# Patient Record
Sex: MALE | Race: WHITE | NOT HISPANIC OR LATINO | URBAN - METROPOLITAN AREA
[De-identification: names, ages, dates, MRNs, and addresses within clinical notes are randomized per-mention and may not be internally consistent; named-entity substitution may affect disease eponyms.]

---

## 2017-02-03 ENCOUNTER — ALLSCRIPTS OFFICE VISIT (OUTPATIENT)
Dept: OTHER | Facility: OTHER | Age: 52
End: 2017-02-03

## 2017-02-10 ENCOUNTER — ALLSCRIPTS OFFICE VISIT (OUTPATIENT)
Dept: OTHER | Facility: OTHER | Age: 52
End: 2017-02-10

## 2017-06-23 ENCOUNTER — GENERIC CONVERSION - ENCOUNTER (OUTPATIENT)
Dept: OTHER | Facility: OTHER | Age: 52
End: 2017-06-23

## 2017-08-04 ENCOUNTER — ALLSCRIPTS OFFICE VISIT (OUTPATIENT)
Dept: OTHER | Facility: OTHER | Age: 52
End: 2017-08-04

## 2017-08-10 DIAGNOSIS — E11.8 TYPE 2 DIABETES MELLITUS WITH COMPLICATIONS (HCC): ICD-10-CM

## 2017-12-13 ENCOUNTER — GENERIC CONVERSION - ENCOUNTER (OUTPATIENT)
Dept: FAMILY MEDICINE CLINIC | Facility: CLINIC | Age: 52
End: 2017-12-13

## 2017-12-15 ENCOUNTER — ALLSCRIPTS OFFICE VISIT (OUTPATIENT)
Dept: OTHER | Facility: OTHER | Age: 52
End: 2017-12-15

## 2017-12-15 ENCOUNTER — GENERIC CONVERSION - ENCOUNTER (OUTPATIENT)
Dept: OTHER | Facility: OTHER | Age: 52
End: 2017-12-15

## 2017-12-15 DIAGNOSIS — E11.8 TYPE 2 DIABETES MELLITUS WITH COMPLICATIONS (HCC): ICD-10-CM

## 2017-12-15 DIAGNOSIS — Z12.5 ENCOUNTER FOR SCREENING FOR MALIGNANT NEOPLASM OF PROSTATE: ICD-10-CM

## 2018-01-11 NOTE — MISCELLANEOUS
Message  Return to work or school:   Issa Hirsch is under my professional care  He was seen in my office on 2/8/16       Please adjust start time to 11;00 pm through5/16/16          Signatures   Electronically signed by : Shayna Flannery DO; Feb 8 2016 10:31AM EST                       (Author)    Electronically signed by : Shayna Flannery DO; Feb 15 2016  8:01AM EST                       (Author)

## 2018-01-12 VITALS
SYSTOLIC BLOOD PRESSURE: 134 MMHG | HEART RATE: 80 BPM | HEIGHT: 62 IN | DIASTOLIC BLOOD PRESSURE: 76 MMHG | BODY MASS INDEX: 28.63 KG/M2 | WEIGHT: 155.6 LBS

## 2018-01-12 NOTE — MISCELLANEOUS
Message  Return to work or school:  8/12/16       Schedule from 8/16/16 through2/16/17 to start at 11:00 pm         Signatures   Electronically signed by : Conor Tuttle DO; Aug 12 2016 10:56AM EST                       (Author)    Electronically signed by : Conor Tuttle DO; Aug 28 2016  8:37PM EST                       (Author)

## 2018-01-14 VITALS
SYSTOLIC BLOOD PRESSURE: 112 MMHG | BODY MASS INDEX: 28.67 KG/M2 | WEIGHT: 155.8 LBS | HEART RATE: 76 BPM | HEIGHT: 62 IN | DIASTOLIC BLOOD PRESSURE: 74 MMHG

## 2018-01-16 NOTE — MISCELLANEOUS
Message  Return to work or school:   Walter Zuniga is under my professional care  He was seen in my office on 5/16/16   He is able to return to work on  5/16/16      Please allow robin to work 11:00 pm to7:00 am 5/16/16 through8/16/16 due to health issuea          Signatures   Electronically signed by : Stella Wang DO; May 16 2016  9:38AM EST                       (Author)    Electronically signed by : Stella Wang DO; May 20 2016  6:10PM EST                       (Author)

## 2018-01-17 NOTE — MISCELLANEOUS
Message  Return to work or school:   Llewellyn Leventhal is under my professional care  He was seen in my office on 8/4/17   He is able to return to work on  8/7/17      Please excuse from work 8/1 through 5/7/33/NAT to complications from diabetes          Signatures   Electronically signed by : Julian Batres DO; Aug  4 2017  1:29PM EST                       (Author)    Electronically signed by : Julian Batres DO; Aug  6 2017  8:40PM EST                       (Author)

## 2018-01-22 VITALS
TEMPERATURE: 98.5 F | WEIGHT: 145.38 LBS | OXYGEN SATURATION: 95 % | HEART RATE: 78 BPM | HEIGHT: 62 IN | SYSTOLIC BLOOD PRESSURE: 118 MMHG | RESPIRATION RATE: 16 BRPM | BODY MASS INDEX: 26.75 KG/M2 | DIASTOLIC BLOOD PRESSURE: 80 MMHG

## 2018-01-23 NOTE — PROGRESS NOTES
Assessment    1  Encounter for preventive health examination (V70 0) (Z00 00)   2  Controlled diabetes mellitus type 2 with complications (748 77) (H04 7)   3  Encounter for prostate cancer screening (V76 44) (Z12 5)    Plan  Allergic rhinitis    · Montelukast Sodium 10 MG Oral Tablet (Singulair)  Controlled diabetes mellitus type 2 with complications    · (1) COMPREHENSIVE METABOLIC PANEL; Status:Active; Requested for:15Vna4474;    · (1) LIPID PANEL FASTING W DIRECT LDL REFLEX; Status:Active; Requested  for:45Gtn0845;   Encounter for prostate cancer screening    · (1) PSA (SCREEN) (Dx V76 44 Screen for Prostate Cancer); Status:Active; Requested  for:93Otf0569;    · (1) URINALYSIS (will reflex a microscopy if leukocytes, occult blood, protein or nitrites  are not within normal limits); Status:Active; Requested for:27Pbf6393;   Flu vaccine need    · Fluzone Quadrivalent Intramuscular Suspension    Discussion/Summary  health maintenance visit Currently, he eats a healthy diet  Prostate cancer screening: PSA was ordered  Testicular cancer screening: testicular cancer screening is not indicated  Colorectal cancer screening: colorectal cancer screening is current  Screening lab work includes lipid profile  The immunizations are up to date  Advice and education were given regarding vitamin D supplements  Patient discussion: discussed with the patient  The patient was counseled on Hepatitis C screening  The patient agrees to Hepatitis C screening  Chief Complaint  Pt here for physical      History of Present Illness  HM, Adult Male:   General Health: The patient's health since the last visit is described as good  He has regular dental visits  He denies vision problems  He has hearing loss  Lifestyle:  He consumes a diverse and healthy diet  He does not have any weight concerns  He exercises regularly  He does not use tobacco  He consumes alcohol  He denies drug use     Screening: cancer screening reviewed and updated  metabolic screening reviewed and updated  risk screening reviewed and updated  Review of Systems    Constitutional: No fever or chills, feels well, no tiredness, no recent weight gain or weight loss  Eyes: No complaints of eye pain, no red eyes, no discharge from eyes, no itchy eyes  The patient presents with complaints of bilateral ear hearing loss, described as difficulty with all sounds  Cardiovascular: No complaints of slow heart rate, no fast heart rate, no chest pain, no palpitations, no leg claudication, no lower extremity  Respiratory: No complaints of shortness of breath, no wheezing, no cough, no SOB on exertion, no orthopnea or PND  Gastrointestinal: No complaints of abdominal pain, no constipation, no nausea or vomiting, no diarrhea or bloody stools  Genitourinary: No complaints of dysuria, no incontinence, no hesitancy, no nocturia, no genital lesion, no testicular pain  Musculoskeletal: arthralgias and joint stiffness  Integumentary: No complaints of skin rash or skin lesions, no itching, no skin wound, no dry skin  Neurological: No compliants of headache, no confusion, no convulsions, no numbness or tingling, no dizziness or fainting, no limb weakness, no difficulty walking  Psychiatric: Is not suicidal, no sleep disturbances, no anxiety or depression, no change in personality, no emotional problems  Endocrine: No complaints of proptosis, no hot flashes, no muscle weakness, no erectile dysfunction, no deepening of the voice, no feelings of weakness  Hematologic/Lymphatic: No complaints of swollen glands, no swollen glands in the neck, does not bleed easily, no easy bruising  Active Problems    1  Acute URI (465 9) (J06 9)   2  Allergic rhinitis (477 9) (J30 9)   3  Anxiety (300 00) (F41 9)   4  Back pain, acute (724 5) (M54 9)   5  Bilateral low back pain without sciatica (724 2) (M54 5)   6   Controlled diabetes mellitus type 2 with complications (615 53) (E11 8)   7  Encounter for completion of form with patient (V68 89) (Z02 89)   8  Erectile dysfunction (607 84) (N52 9)   9  Flu syndrome (487 1) (J11 1)   10  Flu vaccine need (V04 81) (Z23)   11  Hyperlipidemia (272 4) (E78 5)   12  Rhus dermatitis (692 6) (L23 7)   13  Shoulder pain, left (719 41) (M25 512)   14  Tendonitis of elbow, left (727 09) (M77 8)   15  Tendonitis of elbow, right (727 09) (M77 8)   16  Type 2 diabetes mellitus (250 00) (E11 9)    Past Medical History    · Anxiety (300 00) (F41 9)   · Denied: History of alcohol abuse   · History of hypertension (V12 59) (Z86 79)   · Denied: History of mental disorder   · Denied: History of substance abuse   · Hyperlipidemia (272 4) (E78 5)   · Type 2 diabetes mellitus (250 00) (E11 9)    Surgical History    · History of Nasal Septal Deviation Repair    Family History  Mother    · Family history of Ovarian cancer  Father    · Family history of lung cancer (V16 1) (Z80 1)  Brother    · Family history of arthritis (V17 7) (Z82 61)  Family History    · Denied: Family history of alcohol abuse   · Denied: Family history of mental disorder   · Denied: Family history of substance abuse    Social History    · Advance directive status unknown (V49 89) (Z78 9)   · Alcohol use   · Always uses seat belt   · Does not exercise (V69 0) (Z72 3)   · Employed   · Denied: History of domestic violence   · House   · Lives with spouse   ·    · Never a smoker   · One child   · Denied: History of Alevism status   · Supportive and safe   · Uses     Current Meds   1  ALPRAZolam 0 25 MG Oral Tablet; TAKE ONE TABLET BY MOUTH THREE TIMES   DAILY; Therapy: 53KEG6554 to (Evaluate:31Jan2018); Last Rx:14Bmz3602 Ordered   2  Janumet TABS Recorded   3  Montelukast Sodium 10 MG Oral Tablet; Take 1 tablet daily; Therapy: 40SNP3317 to (Crissy Krishnan)  Requested for: 64XPY0991; Last   Rx:09Mar2016 Ordered   4   Simvastatin 40 MG Oral Tablet; TAKE 1 TABLET DAILY AT BEDTIME; Therapy: 34IRL6357 to (Evaluate:15Xjk3788); Last Rx:44Xix5367 Ordered    Allergies    1  codeine   2  Penicillins    3  Seasonal    Vitals   Recorded: 13QDD3598 09:37AM   Temperature 97 4 F, Temporal   Heart Rate 66   Respiration 16   Systolic 071, Sitting   Diastolic 82, Sitting   Height 5 ft 2 in   Weight 149 lb 2 oz   BMI Calculated 27 28   BSA Calculated 1 69     Physical Exam    Constitutional   General appearance: No acute distress, well appearing and well nourished  Eyes   Pupils and irises: Equal, round, reactive to light  Ears, Nose, Mouth, and Throat   External inspection of ears and nose: Normal   wearing hearing aids bilat  Oropharynx: Normal with no erythema, edema, exudate or lesions  Neck   Thyroid: Normal, no thyromegaly  Pulmonary   Auscultation of lungs: Clear to auscultation  Cardiovascular   Auscultation of heart: Normal rate and rhythm, normal S1 and S2, no murmurs  Peripheral vascular exam: Normal     Examination of extremities for edema and/or varicosities: Normal     Abdomen   Abdomen: Non-tender, no masses  Musculoskeletal   Gait and station: Normal     Inspection/palpation of joints, bones, and muscles: Normal     Stability: Normal     Muscle strength/tone: Normal     Skin   Skin and subcutaneous tissue: Normal without rashes or lesions      Psychiatric   Orientation to person, place and time: Normal     Mood and affect: Normal        Results/Data  (1) HEMOGLOBIN A1C 67JNO6760 09:47AM Ashlee Barkley     Test Name Result Flag Reference   HEMOGLOBIN A1C 6 7         Future Appointments    Date/Time Provider Specialty Site   02/14/2018 10:00 AM Ashlee Barkley DO Family Medicine [place] [address] [facility]     Signatures   Electronically signed by : Dong James DO; Dec 17 2017  7:50PM EST                       (Author)

## 2018-01-24 VITALS
HEART RATE: 66 BPM | SYSTOLIC BLOOD PRESSURE: 120 MMHG | DIASTOLIC BLOOD PRESSURE: 82 MMHG | WEIGHT: 149.13 LBS | RESPIRATION RATE: 16 BRPM | BODY MASS INDEX: 27.44 KG/M2 | HEIGHT: 62 IN | TEMPERATURE: 97.4 F

## 2018-01-24 NOTE — PROGRESS NOTES
Assessment   1  Anxiety (300 00) (F41 9)1   2  Hyperlipidemia (272 4) (E78 5)1   3  Type 2 diabetes mellitus (250 00) (E11 9)1      1 Amended By: Alek Malik; May 20 2016 6:08 PM EST    Discussion/Summary    Note created to start work at 11pm or nex 3 mnths1        1 Amended By: Alek Malik; May 20 2016 6:10 PM EST    Chief Complaint   1  Anxiety  ANXIETY FOR A WHILE  HAD RECENT SHIFT CHANGE AT WORK  STARTS TODAY AND GOES UNTIL 8/16/16  History of Present Illness  HPI: pt requests note foremployer to adjust hours1        1 Amended By: Alek Malik; May 20 2016 6:07 PM EST    Review of Systems    Constitutional:1  no fever or chills, feels well, no tiredness, no recent weight loss or weight gain1   ENT:1  no complaints of earache, no loss of hearing, no nosebleeds or nasal discharge, no sore throat or hoarseness1   Cardiovascular: 1  no complaints of slow or fast heart rate, no chest pain, no palpitations, no leg claudication or lower extremity edema1   Respiratory:1  no complaints of shortness of breath, no wheezing or cough, no dyspnea on exertion, no orthopnea or PND1   Gastrointestinal:1  no complaints of abdominal pain, no constipation, no nausea or vomiting, no diarrhea or bloody stools1   Genitourinary:1  no complaints of dysuria or incontinence, no hesitancy, no nocturia, no genital lesion, no inadequacy of penile erection1   Musculoskeletal:1  no complaints of arthralgia, no myalgia, no joint swelling or stiffness, no limb pain or swelling1   Integumentary:1  no complaints of skin rash or lesion, no itching or dry skin, no skin wounds1   Neurological:1  no complaints of headache, no confusion, no numbness or tingling, no dizziness or fainting1         1 Amended By: Alek Malik; May 20 2016 6:07 PM EST    Active Problems   1  Acute URI (465 9) (J06 9)  2  Allergic rhinitis (477 9) (J30 9)  3  Anxiety (300 00) (F41 9)  4  Back pain, acute (724 5) (M54 9)  5   Bilateral low back pain without sciatica (724 2) (M54 5)  6  Erectile dysfunction (607 84) (N52 9)  7  Flu syndrome (487 1) (J11 1)  8  Hyperlipidemia (272 4) (E78 5)  9  Rhus dermatitis (692 6) (L23 7)  10  Shoulder pain, left (719 41) (M25 512)  11  Tendonitis of elbow, left (727 09) (M77 8)  12  Tendonitis of elbow, right (727 09) (M77 8)  13  Type 2 diabetes mellitus (250 00) (E11 9)    Past Medical History   1  Anxiety (300 00) (F41 9)  2  History of hypertension (V12 59) (Z86 79)  3  Hyperlipidemia (272 4) (E78 5)  4  Type 2 diabetes mellitus (250 00) (E11 9)  Active Problems And Past Medical History Reviewed: The active problems and past medical history were reviewed and updated today  1        1 Amended By: Zeb Collier; May 20 2016 6:08 PM EST    Family History  Mother   1  Family history of Ovarian cancer  Father   2  Family history of lung cancer (V16 1) (Z80 1)  Brother   3  Family history of arthritis (V17 7) (Z82 61)    Social History    · Alcohol use   · Does not exercise (V69 0) (Z72 3)   · Denied: History of domestic violence   ·    · Never a smoker   · One child  The social history was reviewed and updated today  1  The social history was reviewed and is unchanged  1        1 Amended By: Zeb Collier; May 20 2016 6:08 PM EST    Surgical History   1  History of Nasal Septal Deviation Repair    Current Meds  1  Janumet TABS Recorded  2  Montelukast Sodium 10 MG Oral Tablet; Take 1 tablet daily; Therapy: 08MNG9338 to ([phone])  Requested for: 63BUL2623; Last   Rx:09Mar2016 Ordered  3  Simvastatin 40 MG Oral Tablet; TAKE 1 TABLET DAILY AT BEDTIME; Therapy: 16Jun2014-(Evaluate:20Nov2014) Recorded    The medication list was reviewed and updated today  1        1 Amended By: Zeb Collier; May 20 2016 6:08 PM EST    Allergies   1  codeine  2  Penicillins   3   Seasonal    Vitals   Recorded: 98YHQ1788 09:12AM   Heart Rate 72   Systolic 745   Diastolic 70   Height 5 ft 2 in   Weight 157 lb    BMI Calculated 28 72   BSA Calculated 1 73     Message  Return to work or school:   Robson Hardy is under my professional care  He was seen in my office on 5/16/16   He is able to return to work on  5/16/16      Please allow robin to work 11:00 pm to7:00 am 5/16/16 through8/16/16 due to health issuea          Signatures   Electronically signed by : Denise Lau DO; May 20 2016  6:10PM EST                       (Author)

## 2018-01-24 NOTE — PROGRESS NOTES
Assessment   1  Anxiety (300 00) (F41 9)  2  Tendonitis of elbow, left (727 09) (M77 8)  3  Tendonitis of elbow, right (727 09) (M77 8)    Plan  Tendonitis of elbow, right    · *1 - [facility]  Consult bilat elbow pain tendonitis  Status:  Hold For - Scheduling  Requested for: 16CIW7742  () Care Summary provided  : Yes    Discussion/Summary    Note for work entered  Chief Complaint   1  Anxiety  2  Headache  1  NEEDS LETTER TO CHANGE IS WORK SHIFT FROM A START TIME OF 10 PM TO 11 PM  STARTING 2/16/2016 THROUGH 5/16/2016   2  ANXIETY AND HEADACHES ARE COMING BACK  3  PAIN IN ELBOWS AND HANDS  RIGHT SIDE IS WORSE  PAINFUL TO  HEAVY OBJECTS  CONCERNED IT IS FROM HIS DIABETES  History of Present Illness  HPI: c/o bilat hand and elbow pain mostly with heay lift at work up to 30 lbs multiple times a day  needs work hour adjustment      Review of Systems    Constitutional:1  feeling poorly1  and feeling tired1   ENT: no complaints of earache, no loss of hearing, no nosebleeds or nasal discharge, no sore throat or hoarseness1   Cardiovascular: 1  no complaints of slow or fast heart rate, no chest pain, no palpitations, no leg claudication or lower extremity edema1   Respiratory:1  no complaints of shortness of breath, no wheezing or cough, no dyspnea on exertion, no orthopnea or PND1   Gastrointestinal:1  no complaints of abdominal pain, no constipation, no nausea or vomiting, no diarrhea or bloody stools1   Genitourinary:1  no complaints of dysuria or incontinence, no hesitancy, no nocturia, no genital lesion, no inadequacy of penile erection1   Musculoskeletal: limb pain1   Integumentary:1  no complaints of skin rash or lesion, no itching or dry skin, no skin wounds1   Neurological:1  no complaints of headache, no confusion, no numbness or tingling, no dizziness or fainting1         1 Amended By: Darron Marvin; Feb 15 2016 8:00 AM EST    Active Problems   1  Acute URI (465 9) (J06 9)  2  Allergic rhinitis (477 9) (J30 9)  3  Anxiety (300 00) (F41 9)  4  Back pain, acute (724 5) (M54 9)  5  Bilateral low back pain without sciatica (724 2) (M54 5)  6  Erectile dysfunction (607 84) (N52 9)  7  Flu syndrome (487 1) (J11 1)  8  Hyperlipidemia (272 4) (E78 5)  9  Rhus dermatitis (692 6) (L23 7)  10  Shoulder pain, left (719 41) (M25 512)  11  Type 2 diabetes mellitus (250 00) (E11 9)    Past Medical History   1  Anxiety (300 00) (F41 9)  2  History of hypertension (V12 59) (Z86 79)  3  Hyperlipidemia (272 4) (E78 5)  4  Type 2 diabetes mellitus (250 00) (E11 9)  Active Problems And Past Medical History Reviewed: The active problems and past medical history were reviewed and updated today  1        1 Amended By: Ashly Priest; Feb 15 2016 8:00 AM EST    Family History   1  Family history of Ovarian cancer   2  Family history of lung cancer (V16 1) (Z80 1)   3  Family history of arthritis (V17 7) (Z82 61)  Family History Reviewed: The family history was reviewed and updated today  1        1 Amended By: Ashly Rodriguez Feb 15 2016 8:00 AM EST    Social History    · Alcohol use   · Does not exercise (V69 0) (Z72 3)   · Denied: History of domestic violence   ·    · Never a smoker   · One child  The social history was reviewed and updated today  1  The social history was reviewed and is unchanged  1        1 Amended By: Ashly Rodriguez Feb 15 2016 8:00 AM EST    Surgical History   1  History of Nasal Septal Deviation Repair  Surgical History Reviewed: The surgical history was reviewed and updated today  1        1 Amended By: Ashly Rodriguez Feb 15 2016 8:00 AM EST    Current Meds  1  Janumet TABS Recorded  2  Simvastatin 40 MG Oral Tablet; TAKE 1 TABLET DAILY AT BEDTIME; Therapy: 81Iwc4929-(Evaluate:20Nov2014) Recorded    The medication list was reviewed and updated today  1        1 Amended By: Ashly Rodriguez Feb 15 2016 8:00 AM EST    Allergies   1  codeine  2  Penicillins   3  Seasonal    Vitals   Recorded: 15TOE2883 10:07AM   Temperature 98 7 F   Heart Rate 72   Systolic 587   Diastolic 84   Height 5 ft 2 in   Weight 158 lb    BMI Calculated 28 9   BSA Calculated 1 73     Physical Exam    Constitutional1    General appearance: No acute distress, well appearing and well nourished  1    Pulmonary1    Auscultation of lungs: Clear to auscultation, equal breath sounds bilaterally, no wheezes, no rales, no rhonci  1    Cardiovascular1    Auscultation of heart: Normal rate and rhythm, normal S1 and S2, without murmurs  1    Lymphatic1    Palpation of lymph nodes in neck: No lymphadenopathy  1    Musculoskeletal1    Inspection/palpation of joints, bones, and muscles: Normal 1    Skin1    Skin and subcutaneous tissue: Normal without rashes or lesions  1          1 Amended By: Shante Ventura; Feb 15 2016 8:01 AM EST    Results/Data  *VB-Foot Exam1 77VTK9614 12:00AM1      Test Name Result Flag Reference   FOOT EXAM1 [id number]       Summary / No summary entered :      No summary entered  Documents attached :      Li Ventura; Enc: [id number] - Image Encounter - Shante Ventura - ([facility]) (Result Document)  (1) HEMOGLOBIN A1C1 31YKG9680 12:00AM1 Shante Ventura     Test Name Result Flag Reference   HEMOGLOBIN A1C1 6 61       Summary / No summary entered :      No summary entered  Documents attached :      Li Ventura; Enc: [id number] - Image Encounter - Shante Ventura - [facility]) (Additional Information Document)  (1) LIPID PANEL, [facility] [id number] 12:00AM1 Shante Ventura     Test Name Result Flag Reference   CHOLESTEROL1 4418     UHD,RVOAPJ0 760     LDL CHOLESTEROL CALCULATED1 [address] 1071       Summary / No summary entered :      No summary entered  Documents attached :      Li Ventura; Carter Betters: [id number] - Image Encounter - Shante Ventura - [facility]) (Additional Information Document)  (1) MICROALBUMIN CREATININE RATIO, RANDOM Latoya Duvall [id number] 12:00AM1 Sandra Barbkieran     Test Name Result Flag Reference   Anoop Pollen <0 91       Summary / No summary entered :      No summary entered  Documents attached :      [facility]; Enc: [id number] - Image Encounter - Sandra Joy - [facility]) (Additional Information Document)  (1) Rufino Frost [id number] 12:00AM1 Sandra Barbkieran     Test Name Result Flag Reference   CREATININE1 0 701       Summary / No summary entered :      No summary entered  Documents attached :      [facility]; Enc: [id number] - Image Encounter - Sandra Joy - [facility]) (Additional Information Document)      1  Amended By: Sandra Joy; Feb 15 2016 7:59 AM EST   Message  Return to work or school:   Andressa Pope is under my professional care  He was seen in my office on 2/8/16       Please adjust start time to 11;00 pm through5/16/16          Signatures   Electronically signed by : Tracy Mckeon DO; Feb 15 2016  8:01AM EST                       (Author)

## 2018-01-24 NOTE — PROGRESS NOTES
Assessment   1  Controlled diabetes mellitus type 2 with complications (591 98) (W35 8)    Plan  Anxiety    · Start: ALPRAZolam 0 25 MG Oral Tablet; TAKE ONE TABLET BY MOUTH THREE TIMES  DAILY    Chief Complaint  46 yr old male pt present today for a follow up on his diabetes  Pt stated he already had his A! C done back in June which was 6 8      History of Present Illness  pt having severe anxiety symptoms at work and is requesting time off  he is concerned about DM getting out of control1        1 Amended By: Jami Bailey; Aug 06 2017 8:39 PM EST    Review of Systems    The patient presents with complaints of gradual onset of mild feeling tired  Symptoms are improved by rest  Symptoms are made worse by stress  Symptoms are unchanged  Pertinent Medical History: diabetes  1   Eyes:1  No complaints of eye pain, no red eyes, no discharge from eyes, no itchy eyes1   ENT:1  hearing loss1   Cardiovascular: 1  No complaints of slow heart rate, no fast heart rate, no chest pain, no palpitations, no leg claudication, no lower extremity1   Respiratory:1  No complaints of shortness of breath, no wheezing, no cough, no SOB on exertion, no orthopnea or PND1   Gastrointestinal:1  No complaints of abdominal pain, no constipation, no nausea or vomiting, no diarrhea or bloody stools1   Genitourinary:1  as noted in Dannielle Cruz   Musculoskeletal:1  No complaints of arthralgia, no myalgias, no joint swelling or stiffness, no limb pain or swelling1   Integumentary:1  No complaints of skin rash or skin lesions, no itching, no skin wound, no dry skin1   Neurological:1  headache1   Psychiatric:1  anxiety1  and sleep disturbances1         1 Amended By: Jami Bailey; Aug 06 2017 8:39 PM EST    Active Problems   1  Acute URI (465 9) (J06 9)  2  Allergic rhinitis (477 9) (J30 9)  3  Anxiety (300 00) (F41 9)  4  Back pain, acute (724 5) (M54 9)  5  Bilateral low back pain without sciatica (724 2) (M54 5)  6   Encounter for completion of form with patient (V68 89) (Z02 89)  7  Erectile dysfunction (607 84) (N52 9)  8  Flu syndrome (487 1) (J11 1)  9  Flu vaccine need (V04 81) (Z23)  10  Hyperlipidemia (272 4) (E78 5)  11  Rhus dermatitis (692 6) (L23 7)  12  Shoulder pain, left (719 41) (M25 512)  13  Tendonitis of elbow, left (727 09) (M77 8)  14  Tendonitis of elbow, right (727 09) (M77 8)  15  Type 2 diabetes mellitus (250 00) (E11 9)    Past Medical History   1  Anxiety (300 00) (F41 9)  2  Denied: History of alcohol abuse  3  History of hypertension (V12 59) (Z86 79)  4  Denied: History of mental disorder  5  Denied: History of substance abuse  6  Hyperlipidemia (272 4) (E78 5)  7  Type 2 diabetes mellitus (250 00) (E11 9)    The active problems and past medical history were reviewed and updated today  1        1 Amended By: Jami Bailey; Aug 06 2017 8:39 PM EST    Surgical History   1  History of Nasal Septal Deviation Repair    Family History  Mother   1  Family history of Ovarian cancer  Father   2  Family history of lung cancer (V16 1) (Z80 1)  Brother   3  Family history of arthritis (V17 7) (Z82 61)  Family History   4  Denied: Family history of alcohol abuse  5  Denied: Family history of mental disorder  6  Denied: Family history of substance abuse    Social History    · Advance directive status unknown (V49 89) (Z78 9)   · Alcohol use   · Always uses seat belt   · Does not exercise (V69 0) (Z72 3)   · Employed   · Denied: History of domestic violence   · House   · Lives with spouse   ·    · Never a smoker   · One child   · Denied: History of Roman [employer] status   · Supportive and safe   · Uses   The social history was reviewed and updated today  1  The social history was reviewed and is unchanged  1        1 Amended By: Jami Bailey; Aug 06 2017 8:39 PM EST    Current Meds  1  Janumet TABS Recorded  2  Montelukast Sodium 10 MG Oral Tablet; Take 1 tablet daily;    Therapy: [id number] to (Raj Fuchs)  Requested for: [id number]; Last   Rx:[id number] Ordered  3  Simvastatin 40 MG Oral Tablet; TAKE 1 TABLET DAILY AT BEDTIME; Therapy: [id number] to (Evaluate:15Oct2017); Last Rx:[id number] Ordered    The medication list was reviewed and updated today  1        1 Amended By: Nikko Lynch; Aug 06 2017 8:39 PM EST    Allergies   1  codeine  2  Penicillins   3  Seasonal    Vitals  Vital Signs    Recorded: 04Aug2017 12:56PM   Temperature 98 5 F   Heart Rate 78   Respiration 16   Systolic 098   Diastolic 80   Height 5 ft 2 in   Weight 145 lb 6 08 oz   BMI Calculated 26 59   BSA Calculated 1 67   O2 Saturation 95     Physical Exam    Constitutional1    General appearance: No acute distress, well appearing and well nourished  1    Pulmonary1    Auscultation of lungs: Clear to auscultation, equal breath sounds bilaterally, no wheezes, no rales, no rhonci  1    Cardiovascular1    Auscultation of heart: Normal rate and rhythm, normal S1 and S2, without murmurs  1    Examination of extremities for edema and/or varicosities: Normal 1    Psychiatric1    Orientation to person, place and time: Normal 1    Mood and affect: Normal 1          1 Amended By: Nikko Lynch; Aug 06 2017 8:40 PM EST    Results/Data  PHQ-9 Adult Depression Screening 04Aug2017 01:00PM User, s     Test Name Result Flag Reference   PHQ-9 Adult Depression Score 8     Over the last two weeks, how often have you been bothered by any of the following problems?   Little interest or pleasure in doing things: Several days - 1  Feeling down, depressed, or hopeless: Nearly every day - 3  Trouble falling or staying asleep, or sleeping too much: Several days - 1  Feeling tired or having little energy: More than half the days - 2  Poor appetite or over eating: Not at all - 0  Feeling bad about yourself - or that you are a failure or have let yourself or your family down: Several days - 1  Trouble concentrating on things, such as reading the newspaper or watching television: Not at all - 0  Moving or speaking so slowly that other people could have noticed  Or the opposite -  being so fidgety or restless that you have been moving around a lot more than usual: Not at all - 0  Thoughts that you would be better off dead, or of hurting yourself in some way: Not at all - 0   PHQ-9 Adult Depression Screening Negative     PHQ-9 Difficulty Level Not difficult at all     PHQ-9 Severity Mild Depression         Message    Evy Ponce is under my professional care  He was seen in my office on 8/4/17   He is able to return to work on  8/7/17      Please excuse from work 8/1 through 4/3/60/[facility] to complications from diabetes          Signatures   Electronically signed by : Conor Tuttle DO; Aug  6 2017  8:40PM EST                       (Author)

## 2018-02-14 ENCOUNTER — OFFICE VISIT (OUTPATIENT)
Dept: FAMILY MEDICINE CLINIC | Facility: CLINIC | Age: 53
End: 2018-02-14
Payer: COMMERCIAL

## 2018-02-14 VITALS
DIASTOLIC BLOOD PRESSURE: 88 MMHG | TEMPERATURE: 97.6 F | WEIGHT: 153.38 LBS | HEART RATE: 76 BPM | SYSTOLIC BLOOD PRESSURE: 128 MMHG | HEIGHT: 62 IN | RESPIRATION RATE: 16 BRPM | BODY MASS INDEX: 28.22 KG/M2

## 2018-02-14 DIAGNOSIS — G43.909 MIGRAINE WITHOUT STATUS MIGRAINOSUS, NOT INTRACTABLE, UNSPECIFIED MIGRAINE TYPE: ICD-10-CM

## 2018-02-14 DIAGNOSIS — M25.50 ARTHRALGIA, UNSPECIFIED JOINT: ICD-10-CM

## 2018-02-14 DIAGNOSIS — E11.9 TYPE 2 DIABETES MELLITUS WITHOUT COMPLICATION, WITHOUT LONG-TERM CURRENT USE OF INSULIN (HCC): Primary | ICD-10-CM

## 2018-02-14 PROCEDURE — 99213 OFFICE O/P EST LOW 20 MIN: CPT | Performed by: FAMILY MEDICINE

## 2018-02-14 RX ORDER — KETOCONAZOLE 20 MG/G
CREAM TOPICAL
COMMUNITY
Start: 2018-02-13 | End: 2020-06-08 | Stop reason: SDUPTHER

## 2018-02-14 RX ORDER — ALPRAZOLAM 0.25 MG/1
1 TABLET ORAL 3 TIMES DAILY
COMMUNITY
Start: 2017-08-04 | End: 2018-03-19 | Stop reason: SDUPTHER

## 2018-02-14 RX ORDER — MONTELUKAST SODIUM 10 MG/1
1 TABLET ORAL DAILY
COMMUNITY
Start: 2016-02-08 | End: 2018-04-18 | Stop reason: SDUPTHER

## 2018-02-14 RX ORDER — SODIUM, POTASSIUM,MAG SULFATES 17.5-3.13G
SOLUTION, RECONSTITUTED, ORAL ORAL
Refills: 0 | COMMUNITY
Start: 2018-01-07 | End: 2018-10-18

## 2018-02-14 RX ORDER — SIMVASTATIN 40 MG
1 TABLET ORAL
COMMUNITY
Start: 2014-06-16 | End: 2018-10-11 | Stop reason: SDUPTHER

## 2018-02-14 NOTE — PROGRESS NOTES
Pr here today tohave new FMLA formcompleted for his migraines and recurrent arthritic complaints    Also need new letter for his start tie to be 11pm for the next year    Paper work completed and leter generated  Time spent with patient was 20 min

## 2018-02-14 NOTE — PROGRESS NOTES
Assessment/Plan:      There are no diagnoses linked to this encounter  Subjective:     Patient ID: Rashmi Ortega is a 46 y o  male      HPI    Review of Systems      Objective:     Physical Exam

## 2018-02-14 NOTE — LETTER
to whom it may concern    Re: Sonja Hurtado    1965    Mr Yaneth Montemayor should work starting at 11 pm instead of 10 pm  This is in force until 2/15/19    This is related to his health conditions     Sincerely         [address] [facility]

## 2018-02-26 DIAGNOSIS — F41.9 ANXIETY: Primary | ICD-10-CM

## 2018-02-26 RX ORDER — ALPRAZOLAM 0.25 MG/1
1 TABLET ORAL
COMMUNITY
Start: 2017-09-30 | End: 2018-03-19 | Stop reason: SDUPTHER

## 2018-02-26 RX ORDER — ALPRAZOLAM 0.25 MG/1
0.25 TABLET ORAL
Qty: 90 TABLET | Refills: 1 | Status: SHIPPED | OUTPATIENT
Start: 2018-02-26 | End: 2018-12-17 | Stop reason: SINTOL

## 2018-02-27 ENCOUNTER — TELEPHONE (OUTPATIENT)
Dept: FAMILY MEDICINE CLINIC | Facility: CLINIC | Age: 53
End: 2018-02-27

## 2018-03-13 ENCOUNTER — TELEPHONE (OUTPATIENT)
Dept: FAMILY MEDICINE CLINIC | Facility: CLINIC | Age: 53
End: 2018-03-13

## 2018-03-19 PROBLEM — E11.8 CONTROLLED DIABETES MELLITUS TYPE 2 WITH COMPLICATIONS (HCC): Status: ACTIVE | Noted: 2018-02-14

## 2018-03-19 RX ORDER — LANCETS
1 EACH MISCELLANEOUS 2 TIMES DAILY
COMMUNITY
Start: 2014-06-05 | End: 2018-12-17 | Stop reason: ALTCHOICE

## 2018-04-02 ENCOUNTER — TELEPHONE (OUTPATIENT)
Dept: FAMILY MEDICINE CLINIC | Facility: CLINIC | Age: 53
End: 2018-04-02

## 2018-04-02 NOTE — TELEPHONE ENCOUNTER
Pt will be dropping off FMLA paperwork to be filled out by pcp  Paperwork needs to be faxed once completed

## 2018-04-04 ENCOUNTER — TELEPHONE (OUTPATIENT)
Dept: FAMILY MEDICINE CLINIC | Facility: CLINIC | Age: 53
End: 2018-04-04

## 2018-04-04 NOTE — TELEPHONE ENCOUNTER
Couldn't leave voicemail, phone allen not ring, just wanted to notify the patient his fmla form is faxed and patient can  copy

## 2018-04-06 ENCOUNTER — TELEPHONE (OUTPATIENT)
Dept: FAMILY MEDICINE CLINIC | Facility: CLINIC | Age: 53
End: 2018-04-06

## 2018-04-16 ENCOUNTER — TELEPHONE (OUTPATIENT)
Dept: FAMILY MEDICINE CLINIC | Facility: CLINIC | Age: 53
End: 2018-04-16

## 2018-04-16 NOTE — TELEPHONE ENCOUNTER
Pt calling asking why his la paperwork was changed from a year to 3mos  It expires on 5/31/18 and does not say what his condition is   Questioning why it wasn't documented and also the change from a year to 3mos

## 2018-04-16 NOTE — TELEPHONE ENCOUNTER
I think he gave me some indication that time was to august  Im not sure what he meens that condition not listeid

## 2018-04-17 NOTE — TELEPHONE ENCOUNTER
Pt was notified  Pt did state that on FMLA form the section where his current Dx should be listed was left blank and that his FMLA is usually done for a year  Pls advise

## 2018-04-18 DIAGNOSIS — J30.89 ALLERGIC RHINITIS DUE TO OTHER ALLERGIC TRIGGER, UNSPECIFIED SEASONALITY: Primary | ICD-10-CM

## 2018-04-20 RX ORDER — MONTELUKAST SODIUM 10 MG/1
10 TABLET ORAL DAILY
Qty: 90 TABLET | Refills: 0 | Status: SHIPPED | OUTPATIENT
Start: 2018-04-20 | End: 2019-02-15 | Stop reason: SDUPTHER

## 2018-04-23 ENCOUNTER — TELEPHONE (OUTPATIENT)
Dept: FAMILY MEDICINE CLINIC | Facility: CLINIC | Age: 53
End: 2018-04-23

## 2018-04-23 NOTE — TELEPHONE ENCOUNTER
Left a message that FMLA forms are ready for  and Dr Usha Lewis says he can keep them for his records

## 2018-04-25 LAB
LEFT EYE DIABETIC RETINOPATHY: NORMAL
RIGHT EYE DIABETIC RETINOPATHY: NORMAL
SEVERITY (EYE EXAM): NORMAL

## 2018-09-21 ENCOUNTER — OFFICE VISIT (OUTPATIENT)
Dept: FAMILY MEDICINE CLINIC | Facility: CLINIC | Age: 53
End: 2018-09-21
Payer: COMMERCIAL

## 2018-09-21 VITALS
TEMPERATURE: 98.1 F | WEIGHT: 148 LBS | HEIGHT: 62 IN | DIASTOLIC BLOOD PRESSURE: 80 MMHG | SYSTOLIC BLOOD PRESSURE: 130 MMHG | RESPIRATION RATE: 18 BRPM | BODY MASS INDEX: 27.23 KG/M2 | HEART RATE: 64 BPM

## 2018-09-21 DIAGNOSIS — M25.551 PAIN OF RIGHT HIP JOINT: ICD-10-CM

## 2018-09-21 DIAGNOSIS — G89.29 CHRONIC LEFT SHOULDER PAIN: ICD-10-CM

## 2018-09-21 DIAGNOSIS — M25.512 CHRONIC LEFT SHOULDER PAIN: ICD-10-CM

## 2018-09-21 DIAGNOSIS — E11.9 TYPE 2 DIABETES MELLITUS WITHOUT COMPLICATION, WITHOUT LONG-TERM CURRENT USE OF INSULIN (HCC): ICD-10-CM

## 2018-09-21 DIAGNOSIS — R51.9 CHRONIC NONINTRACTABLE HEADACHE, UNSPECIFIED HEADACHE TYPE: Primary | ICD-10-CM

## 2018-09-21 DIAGNOSIS — G89.29 CHRONIC NONINTRACTABLE HEADACHE, UNSPECIFIED HEADACHE TYPE: Primary | ICD-10-CM

## 2018-09-21 LAB
CREAT UR-MCNC: 158 MG/DL
MICROALBUMIN UR-MCNC: 38.2 MG/L (ref 0–20)
MICROALBUMIN/CREAT 24H UR: 24 MG/G CREATININE (ref 0–30)

## 2018-09-21 PROCEDURE — 3008F BODY MASS INDEX DOCD: CPT | Performed by: FAMILY MEDICINE

## 2018-09-21 PROCEDURE — 82570 ASSAY OF URINE CREATININE: CPT | Performed by: FAMILY MEDICINE

## 2018-09-21 PROCEDURE — 99213 OFFICE O/P EST LOW 20 MIN: CPT | Performed by: FAMILY MEDICINE

## 2018-09-21 PROCEDURE — 3061F NEG MICROALBUMINURIA REV: CPT | Performed by: FAMILY MEDICINE

## 2018-09-21 PROCEDURE — 82043 UR ALBUMIN QUANTITATIVE: CPT | Performed by: FAMILY MEDICINE

## 2018-09-21 NOTE — LETTER
September 21, 2018     Patient: Elvis An   YOB: 1965   Date of Visit: 9/21/2018       To Whom it May Concern:    Elvis An is under my professional care  He was seen in my office on 9/21/2018  He may return to work on 10/1/18  Out of work 9/19-9/22/18  If you have any questions or concerns, please don't hesitate to call           Sincerely,          Burt Leiva MD        CC: No Recipients

## 2018-09-21 NOTE — PROGRESS NOTES
Assessment/Plan:       Diagnoses and all orders for this visit:    Chronic nonintractable headache, unspecified headache type    Pain of right hip joint    Chronic left shoulder pain          Subjective:      Patient ID: Salma Allen is a 48 y o  male  Arthritis   Presents for follow-up visit  He complains of pain  The symptoms have been stable  Affected locations include the left shoulder and right hip (mid back-from [facility] years ago)  His pain is at a severity of 4/10  Headache    This is a recurrent problem  The current episode started more than 1 year ago  The problem occurs intermittently  The problem has been unchanged  The pain is located in the bilateral region  The pain does not radiate  The pain quality is similar to prior headaches  The quality of the pain is described as squeezing and throbbing  The pain is at a severity of 6/10  The pain is moderate  Associated symptoms include dizziness  Pertinent negatives include no sinus pressure  Nothing aggravates the symptoms  He has tried nothing for the symptoms  The treatment provided no relief  The following portions of the patient's history were reviewed and updated as appropriate: allergies, current medications, past family history, past medical history, past social history, past surgical history and problem list       Review of Systems   Constitutional: Negative for activity change, appetite change and unexpected weight change  HENT: Negative for congestion and sinus pressure  Respiratory: Negative for shortness of breath  Cardiovascular: Negative for chest pain  Musculoskeletal: Positive for arthralgias and arthritis  Neurological: Positive for dizziness and headaches           Objective:      /80 (BP Location: Left arm, Patient Position: Sitting, Cuff Size: Adult)   Pulse 64   Temp 98 1 °F (36 7 °C) (Temporal)   Resp 18   Ht 5' 2 25" (1 581 m)   Wt 67 1 kg (148 lb)   BMI 26 85 kg/m²          Physical Exam   Constitutional: He appears well-developed and well-nourished  HENT:   l side of nose very swollen-has hx deviated septum   Cardiovascular: Normal rate, regular rhythm and normal heart sounds  Pulmonary/Chest: Breath sounds normal    Lymphadenopathy:     He has no cervical adenopathy  Vitals reviewed

## 2018-09-28 ENCOUNTER — TELEPHONE (OUTPATIENT)
Dept: FAMILY MEDICINE CLINIC | Facility: CLINIC | Age: 53
End: 2018-09-28

## 2018-10-09 RX ORDER — SIMVASTATIN 40 MG
TABLET ORAL
Qty: 180 TABLET | Refills: 0 | OUTPATIENT
Start: 2018-10-09

## 2018-10-11 ENCOUNTER — TELEPHONE (OUTPATIENT)
Dept: FAMILY MEDICINE CLINIC | Facility: CLINIC | Age: 53
End: 2018-10-11

## 2018-10-11 DIAGNOSIS — E78.5 HYPERLIPIDEMIA, UNSPECIFIED HYPERLIPIDEMIA TYPE: Primary | ICD-10-CM

## 2018-10-11 NOTE — TELEPHONE ENCOUNTER
Patient called in for refill on  simvastatin  40 mg tablet  90 days 90 qty  Sent to [facility] on file   Last ov was 09/21/18

## 2018-10-11 NOTE — TELEPHONE ENCOUNTER
Per camille:     Patient called in for refill on  simvastatin  40 mg tablet  90 days 90 qty  Sent to [facility] on file   Last ov was 09/21/18

## 2018-10-12 RX ORDER — SIMVASTATIN 40 MG
40 TABLET ORAL DAILY
Qty: 90 TABLET | Refills: 1 | Status: SHIPPED | OUTPATIENT
Start: 2018-10-12 | End: 2019-04-16 | Stop reason: SDUPTHER

## 2018-10-18 ENCOUNTER — OFFICE VISIT (OUTPATIENT)
Dept: FAMILY MEDICINE CLINIC | Facility: CLINIC | Age: 53
End: 2018-10-18
Payer: COMMERCIAL

## 2018-10-18 VITALS
SYSTOLIC BLOOD PRESSURE: 122 MMHG | BODY MASS INDEX: 27.27 KG/M2 | HEIGHT: 62 IN | TEMPERATURE: 97.4 F | HEART RATE: 68 BPM | DIASTOLIC BLOOD PRESSURE: 70 MMHG | WEIGHT: 148.2 LBS | RESPIRATION RATE: 18 BRPM

## 2018-10-18 DIAGNOSIS — Z23 ENCOUNTER FOR IMMUNIZATION: ICD-10-CM

## 2018-10-18 DIAGNOSIS — M54.50 CHRONIC BILATERAL LOW BACK PAIN WITHOUT SCIATICA: Primary | ICD-10-CM

## 2018-10-18 DIAGNOSIS — G89.29 CHRONIC RIGHT HIP PAIN: ICD-10-CM

## 2018-10-18 DIAGNOSIS — G89.29 CHRONIC BILATERAL LOW BACK PAIN WITHOUT SCIATICA: Primary | ICD-10-CM

## 2018-10-18 DIAGNOSIS — M25.551 CHRONIC RIGHT HIP PAIN: ICD-10-CM

## 2018-10-18 PROCEDURE — 1036F TOBACCO NON-USER: CPT | Performed by: NURSE PRACTITIONER

## 2018-10-18 PROCEDURE — 99213 OFFICE O/P EST LOW 20 MIN: CPT | Performed by: NURSE PRACTITIONER

## 2018-10-18 PROCEDURE — 3008F BODY MASS INDEX DOCD: CPT | Performed by: NURSE PRACTITIONER

## 2018-10-18 PROCEDURE — 90471 IMMUNIZATION ADMIN: CPT

## 2018-10-18 PROCEDURE — 90682 RIV4 VACC RECOMBINANT DNA IM: CPT

## 2018-10-18 RX ORDER — SITAGLIPTIN AND METFORMIN HYDROCHLORIDE 500; 50 MG/1; MG/1
1 TABLET, FILM COATED ORAL 2 TIMES DAILY WITH MEALS
Refills: 3 | COMMUNITY
Start: 2018-10-11 | End: 2020-06-08 | Stop reason: SDUPTHER

## 2018-10-18 NOTE — LETTER
October 18, 2018     Patient: Tyson Gibson   YOB: 1965   Date of Visit: 10/18/2018       To Whom it May Concern:    Tyson Gibson is under my professional care  He was seen in my office on 10/18/2018  Patient has chronic Right hip pain and well and chronic bilateral back pain  These pain have impacted patient sleep and causes significant sleep disturbance  His hip and back pain make it difficult for patient do a lot of walking  If you have any questions or concerns, please don't hesitate to call           Sincerely,          LIZZY Mishra        CC: No Recipients

## 2018-10-18 NOTE — PROGRESS NOTES
Chief Complaint   Patient presents with    Back Pain     patient needs a letter stating that he has back and hip pain, as well as difficulty sleeping because of the pain       Assessment/Plan:     Diagnoses and all orders for this visit:    Chronic bilateral low back pain without sciatica    Chronic right hip pain    Encounter for immunization  -     influenza vaccine, [id number], quadrivalent, recombinant, PF, 0 5 mL, for patients 18 yr+ (FLUBLOK)    Other orders  -     JANUMET  MG per tablet; Take 1 tablet by mouth 2 (two) times a day with meals      patent offered medication possibilities to help treat pain but patient refused  He states he just wanted a letter to state his orthopedic conditions  Subjective:      Patient ID: Ramila More is a 48 y o  male  Patient states he has taken tylenol and motrin but it never helps  He states he was on muscle relaxer's but does not like how they make him feel  He states he was at physical therapy a few years ago  Also went to see chiropractor and it did help for a little but then came back  He states she does a lot of walking and lifting does not help as well  Patient states he has burning back in his upper back and shoulder  The following portions of the patient's history were reviewed and updated as appropriate: allergies, current medications, past family history, past medical history, past social history, past surgical history and problem list     Review of Systems   Constitutional: Positive for activity change  Respiratory: Negative for chest tightness and shortness of breath  Musculoskeletal: Positive for arthralgias, back pain and gait problem  Negative for joint swelling  Neurological: Positive for weakness  Psychiatric/Behavioral: Positive for sleep disturbance           Objective:      /70 (BP Location: Left arm, Patient Position: Sitting, Cuff Size: Adult)   Pulse 68   Temp (!) 97 4 °F (36 3 °C) (Temporal)   Resp 18   Ht 5' 2 25" (1 581 m)   Wt 67 2 kg (148 lb 3 2 oz)   BMI 26 89 kg/m²          Physical Exam   Constitutional: He is oriented to person, place, and time  Vital signs are normal  He appears well-developed and well-nourished  He does not appear ill  HENT:   Head: Normocephalic and atraumatic  Cardiovascular: Normal rate, regular rhythm, S1 normal and S2 normal     No murmur heard  Pulmonary/Chest: Effort normal and breath sounds normal  No respiratory distress  Genitourinary: Penis normal    Musculoskeletal:        Right hip: He exhibits decreased range of motion  Lumbar back: He exhibits decreased range of motion  Neurological: He is alert and oriented to person, place, and time  Psychiatric: He has a normal mood and affect

## 2018-12-16 DIAGNOSIS — E11.9 TYPE 2 DIABETES MELLITUS WITHOUT COMPLICATION, WITHOUT LONG-TERM CURRENT USE OF INSULIN (HCC): Primary | ICD-10-CM

## 2018-12-17 ENCOUNTER — OFFICE VISIT (OUTPATIENT)
Dept: FAMILY MEDICINE CLINIC | Facility: CLINIC | Age: 53
End: 2018-12-17
Payer: COMMERCIAL

## 2018-12-17 VITALS
BODY MASS INDEX: 27.11 KG/M2 | DIASTOLIC BLOOD PRESSURE: 70 MMHG | WEIGHT: 153 LBS | SYSTOLIC BLOOD PRESSURE: 122 MMHG | HEIGHT: 63 IN

## 2018-12-17 DIAGNOSIS — Z00.00 WELL ADULT EXAM: ICD-10-CM

## 2018-12-17 DIAGNOSIS — M25.512 LEFT SHOULDER PAIN, UNSPECIFIED CHRONICITY: ICD-10-CM

## 2018-12-17 DIAGNOSIS — E11.8 CONTROLLED DIABETES MELLITUS TYPE 2 WITH COMPLICATIONS, UNSPECIFIED WHETHER LONG TERM INSULIN USE (HCC): Primary | ICD-10-CM

## 2018-12-17 DIAGNOSIS — Z23 NEED FOR PNEUMOCOCCAL VACCINATION: ICD-10-CM

## 2018-12-17 DIAGNOSIS — M25.551 CHRONIC RIGHT HIP PAIN: ICD-10-CM

## 2018-12-17 DIAGNOSIS — G89.29 CHRONIC RIGHT HIP PAIN: ICD-10-CM

## 2018-12-17 PROCEDURE — 99396 PREV VISIT EST AGE 40-64: CPT | Performed by: FAMILY MEDICINE

## 2018-12-17 NOTE — PROGRESS NOTES
Assessment/Plan     Healthy male exam      1 here for physical  2  Patient Counseling:  --Nutrition: Stressed importance of moderation in sodium/caffeine intake, saturated fat and cholesterol, caloric balance, sufficient intake of fresh fruits, vegetables, fiber  --Discussed the issue of the daily use of baby aspirin  --Exercise: Stressed the importance of regular exercise  --Substance Abuse: no concerns    --Sexuality: no concerns  --Injury prevention: Discussed safety belts, safety helmets, smoke detector  --Dental health: Discussed importance of regular tooth brushing, flossing, and dental visits  --Immunizations reviewed  --Discussed benefits of screening colonoscopy  --After hours service discussed with patient    3  Discussed the patient's BMI with him  The BMI is in the acceptable range  4  Follow up as needed for acute illness  Matt France is a 48 y o  male and is here for a comprehensive physical exam  The patient reports l shoulder  r hip pain, otherwise ok  Do you take any herbs or supplements that were not prescribed by a doctor? no  Are you taking calcium supplements? no  Are you taking aspirin daily? no     History:  Patient receives prostate care outside our clinic  Date last prostate exam: not known  Date last PSA: 11/18    The following portions of the patient's history were reviewed and updated as appropriate: allergies, current medications, past family history, past medical history, past social history, past surgical history and problem list   no concerns    Review of Systems  Do you have pain that bothers you in your daily life? yes-r hip  l shoulder  Musculoskeletal:positive for l shoulder/r hip pain      Objective     /70   Ht 5' 3" (1 6 m)   Wt 69 4 kg (153 lb)   BMI 27 10 kg/m²     General Appearance:    Alert, cooperative, no distress, appears stated age   Head:    Normocephalic, without obvious abnormality, atraumatic   Eyes:    PERRL, conjunctiva/corneas clear, EOM's intact, fundi     benign, both eyes        Ears:    Normal TM's and external ear canals, both ears   Nose:   Nares normal, septum midline, mucosa normal, no drainage    or sinus tenderness   Throat:   Lips, mucosa, and tongue normal; teeth and gums normal   Neck:   Supple, symmetrical, trachea midline, no adenopathy;        thyroid:  No enlargement/tenderness/nodules; no carotid    bruit or JVD   Back:     Symmetric, no curvature, ROM normal, no CVA tenderness   Lungs:     Clear to auscultation bilaterally, respirations unlabored   Chest wall:    No tenderness or deformity   Heart:    Regular rate and rhythm, S1 and S2 normal, no murmur, rub   or gallop   Abdomen:     Soft, non-tender, bowel sounds active all four quadrants,     no masses, no organomegaly   Genitalia:     Rectal:     Extremities:   Extremities normal, atraumatic, no cyanosis or edema   Pulses:   2+ and symmetric all extremities   Skin:   Skin color, texture, turgor normal, no rashes or lesions   Lymph nodes:   Cervical, supraclavicular, and axillary nodes normal   Neurologic:     Tenderness L hip and r shoulder     Assessment/Plan:    No problem-specific Assessment & Plan notes found for this encounter  Diagnoses and all orders for this visit:    Controlled diabetes mellitus type 2 with complications, unspecified whether long term insulin use (HCC)          Subjective:      Patient ID: Jasiel Enrique is a 48 y o  male      HPI    The following portions of the patient's history were reviewed and updated as appropriate: allergies, current medications, past family history, past medical history, past social history, past surgical history and problem list       Review of Systems      Objective:      /70   Ht 5' 3" (1 6 m)   Wt 69 4 kg (153 lb)   BMI 27 10 kg/m²          Physical Exam

## 2018-12-18 ENCOUNTER — TELEPHONE (OUTPATIENT)
Dept: FAMILY MEDICINE CLINIC | Facility: CLINIC | Age: 53
End: 2018-12-18

## 2018-12-18 NOTE — TELEPHONE ENCOUNTER
SHANE Pt called and states that he is having a reaction to the PPSV23 injx that was given to him at his visit yesterday  He is having a headache, body aches and pain at the site, negative for redness or swelling  Was advised that if he developed nausea, vomiting, fever, chest pain or his symptoms worsen he is to go to the ER  Pt understood and agrees with this plan

## 2018-12-19 PROCEDURE — 90471 IMMUNIZATION ADMIN: CPT | Performed by: FAMILY MEDICINE

## 2018-12-19 PROCEDURE — 90732 PPSV23 VACC 2 YRS+ SUBQ/IM: CPT | Performed by: FAMILY MEDICINE

## 2019-01-04 ENCOUNTER — APPOINTMENT (OUTPATIENT)
Dept: RADIOLOGY | Facility: MEDICAL CENTER | Age: 54
End: 2019-01-04
Payer: COMMERCIAL

## 2019-01-04 DIAGNOSIS — G89.29 CHRONIC RIGHT HIP PAIN: ICD-10-CM

## 2019-01-04 DIAGNOSIS — M25.551 CHRONIC RIGHT HIP PAIN: ICD-10-CM

## 2019-01-04 DIAGNOSIS — M25.512 LEFT SHOULDER PAIN, UNSPECIFIED CHRONICITY: ICD-10-CM

## 2019-01-04 PROCEDURE — 73502 X-RAY EXAM HIP UNI 2-3 VIEWS: CPT

## 2019-01-04 PROCEDURE — 73030 X-RAY EXAM OF SHOULDER: CPT

## 2019-01-09 ENCOUNTER — TELEPHONE (OUTPATIENT)
Dept: FAMILY MEDICINE CLINIC | Facility: CLINIC | Age: 54
End: 2019-01-09

## 2019-02-14 DIAGNOSIS — Z11.59 ENCOUNTER FOR HEPATITIS C SCREENING TEST FOR LOW RISK PATIENT: Primary | ICD-10-CM

## 2019-02-15 ENCOUNTER — OFFICE VISIT (OUTPATIENT)
Dept: FAMILY MEDICINE CLINIC | Facility: CLINIC | Age: 54
End: 2019-02-15
Payer: COMMERCIAL

## 2019-02-15 ENCOUNTER — TELEPHONE (OUTPATIENT)
Dept: FAMILY MEDICINE CLINIC | Facility: CLINIC | Age: 54
End: 2019-02-15

## 2019-02-15 VITALS
OXYGEN SATURATION: 97 % | DIASTOLIC BLOOD PRESSURE: 84 MMHG | SYSTOLIC BLOOD PRESSURE: 122 MMHG | WEIGHT: 153.2 LBS | TEMPERATURE: 96.5 F | HEIGHT: 63 IN | BODY MASS INDEX: 27.14 KG/M2 | HEART RATE: 75 BPM

## 2019-02-15 DIAGNOSIS — E78.5 HYPERLIPIDEMIA, UNSPECIFIED HYPERLIPIDEMIA TYPE: Primary | ICD-10-CM

## 2019-02-15 DIAGNOSIS — E11.8 CONTROLLED TYPE 2 DIABETES MELLITUS WITH COMPLICATION, WITHOUT LONG-TERM CURRENT USE OF INSULIN (HCC): ICD-10-CM

## 2019-02-15 DIAGNOSIS — J30.89 ALLERGIC RHINITIS DUE TO OTHER ALLERGIC TRIGGER, UNSPECIFIED SEASONALITY: ICD-10-CM

## 2019-02-15 PROCEDURE — 3008F BODY MASS INDEX DOCD: CPT | Performed by: FAMILY MEDICINE

## 2019-02-15 PROCEDURE — 99213 OFFICE O/P EST LOW 20 MIN: CPT | Performed by: FAMILY MEDICINE

## 2019-02-15 RX ORDER — MONTELUKAST SODIUM 10 MG/1
10 TABLET ORAL DAILY
Qty: 90 TABLET | Refills: 1 | Status: SHIPPED | OUTPATIENT
Start: 2019-02-15 | End: 2019-09-03 | Stop reason: SDUPTHER

## 2019-02-15 NOTE — ASSESSMENT & PLAN NOTE
No results found for: HGBA1C  Lab due in summer-done last 12/18  No results for input(s): POCGLU in the last 72 hours      Blood Sugar Average: Last 72 hrs:

## 2019-02-15 NOTE — PROGRESS NOTES
Assessment/Plan:    No problem-specific Assessment & Plan notes found for this encounter  Diagnoses and all orders for this visit:    Allergic rhinitis due to other allergic trigger, unspecified seasonality  -     montelukast (SINGULAIR) 10 mg tablet; Take 1 tablet (10 mg total) by mouth daily for 90 days          Subjective:      Patient ID: Stanley Fuller is a 48 y o  male  Needs letter for work for shift start due to diabetes, also 90 day rx singulair      The following portions of the patient's history were reviewed and updated as appropriate: allergies, current medications, past family history, past medical history, past social history, past surgical history and problem list       Review of Systems   Constitutional: Negative for activity change, appetite change and fever  Respiratory: Negative for shortness of breath  Cardiovascular: Negative for chest pain and leg swelling  Gastrointestinal: Negative for abdominal pain  Neurological: Positive for headaches  Occasional         Objective:      /90 (BP Location: Left arm, Patient Position: Sitting, Cuff Size: Adult)   Pulse 75   Temp (!) 96 5 °F (35 8 °C) (Temporal)   Ht 5' 3" (1 6 m)   Wt 69 5 kg (153 lb 3 2 oz)   SpO2 97%   BMI 27 14 kg/m²          Physical Exam   Constitutional: He appears well-developed  Neck: No thyromegaly present  Cardiovascular: Normal rate, regular rhythm and normal heart sounds  Pulmonary/Chest: Effort normal and breath sounds normal    Lymphadenopathy:     He has no cervical adenopathy  Vitals reviewed

## 2019-02-15 NOTE — TELEPHONE ENCOUNTER
Needs letter that his work start time should be 11 PM instead of 10 PM due to diabetes issues  Thank you for your understanding   This is in effect 2/16/19 to 2/16/2020

## 2019-04-09 ENCOUNTER — OFFICE VISIT (OUTPATIENT)
Dept: FAMILY MEDICINE CLINIC | Facility: CLINIC | Age: 54
End: 2019-04-09
Payer: COMMERCIAL

## 2019-04-09 VITALS
BODY MASS INDEX: 26.75 KG/M2 | RESPIRATION RATE: 18 BRPM | SYSTOLIC BLOOD PRESSURE: 140 MMHG | DIASTOLIC BLOOD PRESSURE: 82 MMHG | HEIGHT: 63 IN | TEMPERATURE: 98.8 F | WEIGHT: 151 LBS | HEART RATE: 60 BPM

## 2019-04-09 DIAGNOSIS — M25.551 CHRONIC RIGHT HIP PAIN: ICD-10-CM

## 2019-04-09 DIAGNOSIS — N52.01 ERECTILE DYSFUNCTION DUE TO ARTERIAL INSUFFICIENCY: Primary | ICD-10-CM

## 2019-04-09 DIAGNOSIS — G43.909 MIGRAINE WITHOUT STATUS MIGRAINOSUS, NOT INTRACTABLE, UNSPECIFIED MIGRAINE TYPE: ICD-10-CM

## 2019-04-09 DIAGNOSIS — M25.512 PAIN IN JOINT OF LEFT SHOULDER: ICD-10-CM

## 2019-04-09 DIAGNOSIS — G89.29 CHRONIC RIGHT HIP PAIN: ICD-10-CM

## 2019-04-09 PROCEDURE — 99214 OFFICE O/P EST MOD 30 MIN: CPT | Performed by: NURSE PRACTITIONER

## 2019-04-09 PROCEDURE — 3008F BODY MASS INDEX DOCD: CPT | Performed by: NURSE PRACTITIONER

## 2019-04-09 PROCEDURE — 1036F TOBACCO NON-USER: CPT | Performed by: NURSE PRACTITIONER

## 2019-04-09 RX ORDER — SILDENAFIL 50 MG/1
50 TABLET, FILM COATED ORAL DAILY PRN
Qty: 30 TABLET | Refills: 0 | Status: SHIPPED | OUTPATIENT
Start: 2019-04-09 | End: 2019-06-17 | Stop reason: ALTCHOICE

## 2019-04-16 DIAGNOSIS — E78.5 HYPERLIPIDEMIA, UNSPECIFIED HYPERLIPIDEMIA TYPE: ICD-10-CM

## 2019-04-16 RX ORDER — SIMVASTATIN 40 MG
40 TABLET ORAL DAILY
Qty: 90 TABLET | Refills: 1 | Status: SHIPPED | OUTPATIENT
Start: 2019-04-16 | End: 2019-10-15 | Stop reason: SDUPTHER

## 2019-06-17 ENCOUNTER — OFFICE VISIT (OUTPATIENT)
Dept: FAMILY MEDICINE CLINIC | Facility: CLINIC | Age: 54
End: 2019-06-17
Payer: COMMERCIAL

## 2019-06-17 VITALS
RESPIRATION RATE: 18 BRPM | TEMPERATURE: 98.4 F | WEIGHT: 148 LBS | DIASTOLIC BLOOD PRESSURE: 76 MMHG | HEART RATE: 64 BPM | BODY MASS INDEX: 26.22 KG/M2 | HEIGHT: 63 IN | SYSTOLIC BLOOD PRESSURE: 138 MMHG

## 2019-06-17 DIAGNOSIS — E78.2 MIXED HYPERLIPIDEMIA: ICD-10-CM

## 2019-06-17 DIAGNOSIS — E11.8 CONTROLLED TYPE 2 DIABETES MELLITUS WITH COMPLICATION, WITHOUT LONG-TERM CURRENT USE OF INSULIN (HCC): ICD-10-CM

## 2019-06-17 DIAGNOSIS — N52.9 ERECTILE DYSFUNCTION, UNSPECIFIED ERECTILE DYSFUNCTION TYPE: Primary | ICD-10-CM

## 2019-06-17 PROCEDURE — 1036F TOBACCO NON-USER: CPT | Performed by: NURSE PRACTITIONER

## 2019-06-17 PROCEDURE — 3008F BODY MASS INDEX DOCD: CPT | Performed by: NURSE PRACTITIONER

## 2019-06-17 PROCEDURE — 99213 OFFICE O/P EST LOW 20 MIN: CPT | Performed by: NURSE PRACTITIONER

## 2019-06-17 RX ORDER — TADALAFIL 10 MG/1
10 TABLET ORAL DAILY PRN
Qty: 10 TABLET | Refills: 0 | Status: SHIPPED | OUTPATIENT
Start: 2019-06-17 | End: 2019-10-10 | Stop reason: ALTCHOICE

## 2019-06-19 LAB
LEFT EYE DIABETIC RETINOPATHY: NORMAL
RIGHT EYE DIABETIC RETINOPATHY: NORMAL

## 2019-08-15 ENCOUNTER — OFFICE VISIT (OUTPATIENT)
Dept: FAMILY MEDICINE CLINIC | Facility: CLINIC | Age: 54
End: 2019-08-15
Payer: COMMERCIAL

## 2019-08-15 VITALS
HEIGHT: 63 IN | BODY MASS INDEX: 25.16 KG/M2 | SYSTOLIC BLOOD PRESSURE: 118 MMHG | HEART RATE: 64 BPM | DIASTOLIC BLOOD PRESSURE: 66 MMHG | WEIGHT: 142 LBS | RESPIRATION RATE: 16 BRPM

## 2019-08-15 DIAGNOSIS — E78.2 MIXED HYPERLIPIDEMIA: ICD-10-CM

## 2019-08-15 DIAGNOSIS — E11.8 CONTROLLED TYPE 2 DIABETES MELLITUS WITH COMPLICATION, WITHOUT LONG-TERM CURRENT USE OF INSULIN (HCC): Primary | ICD-10-CM

## 2019-08-15 PROCEDURE — 3008F BODY MASS INDEX DOCD: CPT | Performed by: FAMILY MEDICINE

## 2019-08-15 PROCEDURE — 1036F TOBACCO NON-USER: CPT | Performed by: FAMILY MEDICINE

## 2019-08-15 PROCEDURE — 99213 OFFICE O/P EST LOW 20 MIN: CPT | Performed by: FAMILY MEDICINE

## 2019-08-15 RX ORDER — EMPAGLIFLOZIN 10 MG/1
10 TABLET, FILM COATED ORAL DAILY
Refills: 5 | COMMUNITY
Start: 2019-07-23 | End: 2020-06-08 | Stop reason: SDUPTHER

## 2019-08-15 NOTE — ASSESSMENT & PLAN NOTE
No results found for: HGBA1C    No results for input(s): POCGLU in the last 72 hours      Blood Sugar Average: Last 72 hrs:    Sees endocrine-due for lab 1 2/19

## 2019-08-15 NOTE — PROGRESS NOTES
Assessment/Plan:    Controlled diabetes mellitus type 2 with complications (HCC)  No results found for: HGBA1C    No results for input(s): POCGLU in the last 72 hours  Blood Sugar Average: Last 72 hrs:    Sees endocrine-due for lab 1 2/19       Diagnoses and all orders for this visit:    Controlled type 2 diabetes mellitus with complication, without long-term current use of insulin (HCC)    Mixed hyperlipidemia  -     Lipid panel; Future    Other orders  -     JARDIANCE 10 MG TABS; Take 10 mg by mouth daily  -     Omega-3 Fatty Acids (FISH OIL BURP-LESS) 500 MG CAPS; Take 2 g by mouth daily          Subjective:      Patient ID: Gaby Fiore is a 47 y o  male  F/u diabetes and lipids, doing well, scratched r leg-healing well      The following portions of the patient's history were reviewed and updated as appropriate: allergies, current medications, past family history, past medical history, past social history, past surgical history and problem list     Review of Systems   Constitutional: Negative for activity change, appetite change and unexpected weight change  Respiratory: Negative for shortness of breath  Cardiovascular: Negative for chest pain  Skin: Positive for wound  Scraped  r leg   Neurological: Negative for dizziness and headaches  Objective:      /66 (BP Location: Left arm, Patient Position: Sitting, Cuff Size: Standard)   Pulse 64   Resp 16   Ht 5' 3" (1 6 m)   Wt 64 4 kg (142 lb)   BMI 25 15 kg/m²          Physical Exam   Constitutional: He appears well-developed and well-nourished  Neck: No thyromegaly present  Cardiovascular: Normal rate, regular rhythm and normal heart sounds  Pulmonary/Chest: Effort normal and breath sounds normal    Lymphadenopathy:     He has no cervical adenopathy  Vitals reviewed

## 2019-09-03 DIAGNOSIS — J30.89 ALLERGIC RHINITIS DUE TO OTHER ALLERGIC TRIGGER, UNSPECIFIED SEASONALITY: ICD-10-CM

## 2019-09-03 RX ORDER — MONTELUKAST SODIUM 10 MG/1
TABLET ORAL
Qty: 90 TABLET | Refills: 1 | Status: SHIPPED | OUTPATIENT
Start: 2019-09-03 | End: 2020-06-08 | Stop reason: SDUPTHER

## 2019-09-27 ENCOUNTER — TELEPHONE (OUTPATIENT)
Dept: FAMILY MEDICINE CLINIC | Facility: CLINIC | Age: 54
End: 2019-09-27

## 2019-09-27 DIAGNOSIS — E11.8 CONTROLLED TYPE 2 DIABETES MELLITUS WITH COMPLICATION, WITHOUT LONG-TERM CURRENT USE OF INSULIN (HCC): Primary | ICD-10-CM

## 2019-09-27 NOTE — TELEPHONE ENCOUNTER
PATIENT IS REQUESTING REFILL FOR TEST STRIPS FOR DIABETES PT IS REQUESTING TO BE CHANGED FROM 50PCS TO 100PCS PT IS GOING ON VACATION   PLEASE CALL PATIENT BACK [phone] TO FOLLOW UP

## 2019-10-03 DIAGNOSIS — E11.8 CONTROLLED TYPE 2 DIABETES MELLITUS WITH COMPLICATION, WITHOUT LONG-TERM CURRENT USE OF INSULIN (HCC): Primary | ICD-10-CM

## 2019-10-03 RX ORDER — BLOOD-GLUCOSE METER
KIT MISCELLANEOUS 2 TIMES DAILY
Qty: 1 EACH | Refills: 0 | Status: SHIPPED | OUTPATIENT
Start: 2019-10-03 | End: 2019-10-04 | Stop reason: CLARIF

## 2019-10-10 ENCOUNTER — OFFICE VISIT (OUTPATIENT)
Dept: FAMILY MEDICINE CLINIC | Facility: CLINIC | Age: 54
End: 2019-10-10
Payer: COMMERCIAL

## 2019-10-10 VITALS
SYSTOLIC BLOOD PRESSURE: 128 MMHG | WEIGHT: 141 LBS | BODY MASS INDEX: 24.98 KG/M2 | RESPIRATION RATE: 18 BRPM | TEMPERATURE: 99.4 F | HEART RATE: 72 BPM | HEIGHT: 63 IN | DIASTOLIC BLOOD PRESSURE: 80 MMHG

## 2019-10-10 DIAGNOSIS — G43.909 MIGRAINE WITHOUT STATUS MIGRAINOSUS, NOT INTRACTABLE, UNSPECIFIED MIGRAINE TYPE: ICD-10-CM

## 2019-10-10 DIAGNOSIS — E78.2 MIXED HYPERLIPIDEMIA: ICD-10-CM

## 2019-10-10 DIAGNOSIS — M62.838 MUSCLE SPASM: Primary | ICD-10-CM

## 2019-10-10 DIAGNOSIS — E11.9 TYPE 2 DIABETES MELLITUS WITHOUT COMPLICATION, WITHOUT LONG-TERM CURRENT USE OF INSULIN (HCC): ICD-10-CM

## 2019-10-10 PROCEDURE — 99214 OFFICE O/P EST MOD 30 MIN: CPT | Performed by: FAMILY MEDICINE

## 2019-10-10 NOTE — PROGRESS NOTES
Assessment and Plan:    Problem List Items Addressed This Visit     Hyperlipidemia     Last cholesterol was elevated  He is due for a recheck in December  Follow-up at that point  Consider changing from simvastatin, or re-evaluating if he can stay on it at the current dose  Migraine without status migrainosus, not intractable     Patient does have a history of migraine  It seems to have been made worse recently with the weather changes  As far as medications, the cup of coffee seems to work somewhat  Again, reasonable for him to be out  Muscle spasm - Primary     Patient does have some significant muscle spasms around the trapezius  At this point, recommend ice, heat, range of motion  He did not have much improvement with Tylenol, did not wish to take pills  As far as work, given that he does feel quite weak and discomfort, it is reasonable for him to have been out from the 7th till returning on the 11th  Type 2 diabetes mellitus ([address] )       No results found for: HGBA1C   Patient did have an A1c in June  At that point, it was slightly elevated  He is due for recheck in December  Reordered A1c for December  He does see endocrinology at [place] [id number] practitioner  Relevant Orders    Hemoglobin A1C                 Diagnoses and all orders for this visit:    Muscle spasm    Migraine without status migrainosus, not intractable, unspecified migraine type    Type 2 diabetes mellitus without complication, without long-term current use of insulin ([facility])  -     Hemoglobin A1C; Future    Mixed hyperlipidemia              Subjective:      Patient ID: Jose Ulrich is a 47 y o  male  CC:    Chief Complaint   Patient presents with    Generalized Body Aches     Patient present today for a doctors note  Pt is having body aches specialy on his lower back and headaches for the last 4 days         HPI:    Patient is here because he is having a significant amount of increased pain recently  He does have a history of arthritis, as well as diabetes  Diabetes seems to make the arthritis somewhat worse  Unfortunately, his arthritis is also quite responsive to the weather, with the recent weather changes he has noted an increased amount of discomfort in the back, with some spasms noted  This been bothering him for the last several days  Patient also mention that with the weather changes there has been an increased amount of headache  With these changes, patient found it extremely difficult to go to work  He was out starting on the 7th  Patient's current employment is with the [employer]  His job involves lifting frequently  With that, he was having increasing discomfort        The following portions of the patient's history were reviewed and updated as appropriate: allergies, current medications, past family history, past medical history, past social history, past surgical history and problem list       Review of Systems      Data to review:       Objective:    Vitals:    10/10/19 1045   BP: 128/80   BP Location: Left arm   Patient Position: Sitting   Cuff Size: Standard   Pulse: 72   Resp: 18   Temp: 99 4 °F (37 4 °C)   TempSrc: Temporal   Weight: 64 kg (141 lb)   Height: 5' 3" (1 6 m)        Physical Exam

## 2019-10-10 NOTE — ASSESSMENT & PLAN NOTE
Patient does have a history of migraine  It seems to have been made worse recently with the weather changes  As far as medications, the cup of coffee seems to work somewhat  Again, reasonable for him to be out

## 2019-10-10 NOTE — ASSESSMENT & PLAN NOTE
Patient does have some significant muscle spasms around the trapezius  At this point, recommend ice, heat, range of motion  He did not have much improvement with Tylenol, did not wish to take pills  As far as work, given that he does feel quite weak and discomfort, it is reasonable for him to have been out from the 7th till returning on the 11th

## 2019-10-10 NOTE — PATIENT INSTRUCTIONS
Problem List Items Addressed This Visit     Hyperlipidemia     Last cholesterol was elevated  He is due for a recheck in December  Follow-up at that point  Consider changing from simvastatin, or re-evaluating if he can stay on it at the current dose  Migraine without status migrainosus, not intractable     Patient does have a history of migraine  It seems to have been made worse recently with the weather changes  As far as medications, the cup of coffee seems to work somewhat  Again, reasonable for him to be out  Muscle spasm - Primary     Patient does have some significant muscle spasms around the trapezius  At this point, recommend ice, heat, range of motion  He did not have much improvement with Tylenol, did not wish to take pills  As far as work, given that he does feel quite weak and discomfort, it is reasonable for him to have been out from the 7th till returning on the 11th  Type 2 diabetes mellitus ([facility] [address] )       No results found for: HGBA1C   Patient did have an A1c in June  At that point, it was slightly elevated  He is due for recheck in December  Reordered A1c for December  He does see endocrinology at [place] [address] practitioner  Relevant Orders    Hemoglobin A1C        Advise patient schedule for flu vaccine when it is available for the season due to his/her/their medical conditions

## 2019-10-10 NOTE — LETTER
October 10, 2019     Patient: Manish Grubbs   YOB: 1965   Date of Visit: 10/10/2019       To Whom it May Concern:    Manish Grubbs is under my professional care  He was seen in my office on 10/10/2019  He may return to work on [employer] [address]  Out from [id number]  If you have any questions or concerns, please don't hesitate to call           Sincerely,          Kerry Mace MD        CC: No Recipients

## 2019-10-10 NOTE — ASSESSMENT & PLAN NOTE
Last cholesterol was elevated  He is due for a recheck in December  Follow-up at that point  Consider changing from simvastatin, or re-evaluating if he can stay on it at the current dose

## 2019-10-10 NOTE — ASSESSMENT & PLAN NOTE
No results found for: HGBA1C   Patient did have an A1c in June  At that point, it was slightly elevated  He is due for recheck in December  Reordered A1c for December  He does see endocrinology at [facility] practitioner

## 2019-10-15 DIAGNOSIS — E78.5 HYPERLIPIDEMIA, UNSPECIFIED HYPERLIPIDEMIA TYPE: ICD-10-CM

## 2019-10-15 RX ORDER — SIMVASTATIN 40 MG
40 TABLET ORAL DAILY
Qty: 90 TABLET | Refills: 1 | Status: SHIPPED | OUTPATIENT
Start: 2019-10-15 | End: 2020-06-02 | Stop reason: SDUPTHER

## 2019-11-04 ENCOUNTER — OFFICE VISIT (OUTPATIENT)
Dept: FAMILY MEDICINE CLINIC | Facility: CLINIC | Age: 54
End: 2019-11-04
Payer: COMMERCIAL

## 2019-11-04 VITALS
DIASTOLIC BLOOD PRESSURE: 70 MMHG | BODY MASS INDEX: 24.98 KG/M2 | HEART RATE: 99 BPM | SYSTOLIC BLOOD PRESSURE: 110 MMHG | WEIGHT: 141 LBS | TEMPERATURE: 98.9 F | HEIGHT: 63 IN

## 2019-11-04 DIAGNOSIS — J40 BRONCHITIS: Primary | ICD-10-CM

## 2019-11-04 PROCEDURE — 3008F BODY MASS INDEX DOCD: CPT | Performed by: FAMILY MEDICINE

## 2019-11-04 PROCEDURE — 99213 OFFICE O/P EST LOW 20 MIN: CPT | Performed by: FAMILY MEDICINE

## 2019-11-04 RX ORDER — BENZONATATE 200 MG/1
200 CAPSULE ORAL 3 TIMES DAILY PRN
Qty: 30 CAPSULE | Refills: 0 | Status: SHIPPED | OUTPATIENT
Start: 2019-11-04 | End: 2020-06-08 | Stop reason: ALTCHOICE

## 2019-11-04 RX ORDER — LEVOFLOXACIN 500 MG/1
500 TABLET, FILM COATED ORAL EVERY 24 HOURS
Qty: 10 TABLET | Refills: 0 | Status: SHIPPED | OUTPATIENT
Start: 2019-11-04 | End: 2019-11-14

## 2019-11-04 NOTE — LETTER
November 4, 2019     Patient: Manish Grubbs   YOB: 1965   Date of Visit: 11/4/2019       To Whom it May Concern:    Manish Grubbs is under my professional care  He was seen in my office on 11/4/2019  He may return to work on 11/6/19  If you have any questions or concerns, please don't hesitate to call           Sincerely,          Marianela Schaefer MD        CC: No Recipients

## 2019-11-04 NOTE — PROGRESS NOTES
Assessment/Plan:    No problem-specific Assessment & Plan notes found for this encounter  Diagnoses and all orders for this visit:    Bronchitis  -     benzonatate (TESSALON) 200 MG capsule; Take 1 capsule (200 mg total) by mouth 3 (three) times a day as needed for cough  -     levofloxacin (LEVAQUIN) 500 mg tablet; Take 1 tablet (500 mg total) by mouth every 24 hours for 10 days          Subjective:      Patient ID: Rossana Michael is a 47 y o  male  URI    This is a new problem  The current episode started in the past 7 days  The problem has been rapidly worsening  The maximum temperature recorded prior to his arrival was 100 4 - 100 9 F  The fever has been present for less than 1 day  Associated symptoms include abdominal pain, congestion, coughing, diarrhea, headaches, nausea, rhinorrhea and a sore throat  He has tried NSAIDs and increased fluids for the symptoms  The treatment provided no relief  The following portions of the patient's history were reviewed and updated as appropriate: allergies, current medications, past family history, past medical history, past social history, past surgical history and problem list    Review of Systems   Constitutional: Positive for chills and fever  HENT: Positive for congestion, rhinorrhea and sore throat  Respiratory: Positive for cough  Gastrointestinal: Positive for abdominal pain, diarrhea and nausea  Neurological: Positive for headaches  Hematological: Positive for adenopathy  Objective:      /70   Pulse 99   Temp 98 9 °F (37 2 °C)   Ht 5' 3" (1 6 m)   Wt 64 kg (141 lb)   BMI 24 98 kg/m²          Physical Exam   Constitutional: He appears well-developed and well-nourished  Ill appearing   HENT:   Mouth/Throat: No oropharyngeal exudate, posterior oropharyngeal edema or posterior oropharyngeal erythema  Cardiovascular: Normal rate, regular rhythm, normal heart sounds and intact distal pulses     Pulmonary/Chest: Effort normal  He has wheezes in the right lower field and the left lower field  Lymphadenopathy:     He has cervical adenopathy  Vitals reviewed

## 2019-11-06 ENCOUNTER — TELEPHONE (OUTPATIENT)
Dept: FAMILY MEDICINE CLINIC | Facility: CLINIC | Age: 54
End: 2019-11-06

## 2019-11-06 NOTE — TELEPHONE ENCOUNTER
Pt called and says he was seen the other day for bronchitis and now his nose is really bothering him, nighttime and morning clogged nose  He is still taking his allergy pills and nothing is helping  Please advise

## 2019-12-09 LAB — HBA1C MFR BLD HPLC: 6.7 %

## 2019-12-19 ENCOUNTER — OFFICE VISIT (OUTPATIENT)
Dept: FAMILY MEDICINE CLINIC | Facility: CLINIC | Age: 54
End: 2019-12-19
Payer: COMMERCIAL

## 2019-12-19 VITALS
BODY MASS INDEX: 25.09 KG/M2 | SYSTOLIC BLOOD PRESSURE: 118 MMHG | DIASTOLIC BLOOD PRESSURE: 70 MMHG | TEMPERATURE: 98.1 F | RESPIRATION RATE: 18 BRPM | HEART RATE: 70 BPM | HEIGHT: 63 IN | WEIGHT: 141.6 LBS

## 2019-12-19 DIAGNOSIS — Z12.5 SCREENING FOR PROSTATE CANCER: Primary | ICD-10-CM

## 2019-12-19 DIAGNOSIS — Z23 ENCOUNTER FOR IMMUNIZATION: ICD-10-CM

## 2019-12-19 DIAGNOSIS — Z00.00 WELL ADULT EXAM: ICD-10-CM

## 2019-12-19 PROCEDURE — 90471 IMMUNIZATION ADMIN: CPT

## 2019-12-19 PROCEDURE — 90750 HZV VACC RECOMBINANT IM: CPT

## 2019-12-19 PROCEDURE — 99396 PREV VISIT EST AGE 40-64: CPT | Performed by: FAMILY MEDICINE

## 2019-12-19 NOTE — PROGRESS NOTES
Assessment and Plan:    Problem List Items Addressed This Visit        Endocrine    Type 2 diabetes mellitus ([facility] [address] ) - Primary                 Diagnoses and all orders for this visit:    Type 2 diabetes mellitus without complication, without long-term current use of insulin ([facility])  -     Microalbumin / creatinine urine ratio    Other orders  -     glucose blood test strip; TEST BLOOD SUGAR 2 TIMES PER DAY E 11 9              Subjective:      Patient ID: Heather Moran is a 47 y o  male  CC:    Chief Complaint   Patient presents with    Physical Exam       HPI:    HPI    The following portions of the patient's history were reviewed and updated as appropriate: allergies, current medications, past family history, past medical history, past social history, past surgical history and problem list Assessment/Plan     Healthy male exam     1 brigid  For physical  2  Patient Counseling:  --Nutrition: Stressed importance of moderation in sodium/caffeine intake, saturated fat and cholesterol, caloric balance, sufficient intake of fresh fruits, vegetables  -  --Exercise: Stressed the importance of regular exercise  --Substance Abuse: No concerns, occ alcohol   --Sexuality: no concerns  --Injury prevention: Discussed safety belts, safety helmets, smoke detector  --Dental health: Discussed importance of regular tooth brushing, flossing, and dental visits  [facility]  --Immunizations reviewed -got shingles shot today  --Discussed benefits of screening colonoscopy  --After hours service discussed with patient    3  Discussed the patient's BMI with him  The BMI is in the acceptable range  4  Follow up as needed for acute illness  Kolton Garcia is a 47 y o  male and is here for a comprehensive physical exam  The patient reports no problems  Do you take any herbs or supplements that were not prescribed by a doctor? no  Are you taking calcium supplements? no  Are you taking aspirin daily?  no     History:  Patient receives prostate care outside our clinic  Date last prostate exam: years  Date last PSA: 2/18    The following portions of the patient's history were reviewed and updated as appropriate: allergies, current medications, past family history, past medical history, past social history, past surgical history and problem list   no concerns    Review of Systems  Do you have pain that bothers you in your daily life? yes  Musculoskeletal:positive for back pain      Objective     /70 (BP Location: Left arm, Patient Position: Sitting, Cuff Size: Adult)   Pulse 70   Temp 98 1 °F (36 7 °C) (Temporal)   Resp 18   Ht 5' 3" (1 6 m)   Wt 64 2 kg (141 lb 9 6 oz)   BMI 25 08 kg/m²     General Appearance:    Alert, cooperative, no distress, appears stated age   Head:    Normocephalic, without obvious abnormality, atraumatic   Eyes:    PERRL, conjunctiva/corneas clear, EOM's intact, fundi     benign, both eyes        Ears:    Normal TM's and external ear canals, both ears   Nose:   Nares normal, septum midline, mucosa normal, no drainage    or sinus tenderness   Throat:   Lips, mucosa, and tongue normal; teeth and gums normal   Neck:   Supple, symmetrical, trachea midline, no adenopathy;        thyroid:  No enlargement/tenderness/nodules; no carotid    bruit or JVD   Back:     Symmetric, no curvature, ROM normal, no CVA tenderness   Lungs:     Clear to auscultation bilaterally, respirations unlabored   Chest wall:    No tenderness or deformity   Heart:    Regular rate and rhythm, S1 and S2 normal, no murmur, rub   or gallop   Abdomen:     Soft, non-tender, bowel sounds active all four quadrants,     no masses, no organomegaly   Genitalia:     Rectal:       Extremities:   Extremities normal, atraumatic, no cyanosis or edema   Pulses:   2+ and symmetric all extremities   Skin:   Skin color, texture, turgor normal, no rashes or lesions   Lymph nodes:   Cervical, supraclavicular, and axillary nodes normal Neurologic:               Review of Systems      Data to review:       Objective: There were no vitals filed for this visit       Physical Exam

## 2019-12-19 NOTE — PATIENT INSTRUCTIONS
Exam nl-got shingle shot, will get 2nd in February along with PSA, will need colonoscopy 2021 due to 3 polyps

## 2020-06-02 DIAGNOSIS — E78.5 HYPERLIPIDEMIA, UNSPECIFIED HYPERLIPIDEMIA TYPE: ICD-10-CM

## 2020-06-02 RX ORDER — SIMVASTATIN 40 MG
40 TABLET ORAL DAILY
Qty: 90 TABLET | Refills: 1 | Status: SHIPPED | OUTPATIENT
Start: 2020-06-02 | End: 2020-06-08 | Stop reason: SDUPTHER

## 2020-06-04 ENCOUNTER — TELEPHONE (OUTPATIENT)
Dept: FAMILY MEDICINE CLINIC | Facility: CLINIC | Age: 55
End: 2020-06-04

## 2020-06-08 ENCOUNTER — OFFICE VISIT (OUTPATIENT)
Dept: FAMILY MEDICINE CLINIC | Facility: CLINIC | Age: 55
End: 2020-06-08
Payer: COMMERCIAL

## 2020-06-08 VITALS
HEART RATE: 72 BPM | DIASTOLIC BLOOD PRESSURE: 66 MMHG | BODY MASS INDEX: 24.41 KG/M2 | SYSTOLIC BLOOD PRESSURE: 122 MMHG | HEIGHT: 63 IN | WEIGHT: 137.8 LBS

## 2020-06-08 DIAGNOSIS — J30.89 ALLERGIC RHINITIS DUE TO OTHER ALLERGIC TRIGGER, UNSPECIFIED SEASONALITY: ICD-10-CM

## 2020-06-08 DIAGNOSIS — E78.5 HYPERLIPIDEMIA, UNSPECIFIED HYPERLIPIDEMIA TYPE: ICD-10-CM

## 2020-06-08 DIAGNOSIS — E11.9 TYPE 2 DIABETES MELLITUS WITHOUT COMPLICATION, WITHOUT LONG-TERM CURRENT USE OF INSULIN (HCC): Primary | ICD-10-CM

## 2020-06-08 PROCEDURE — 99213 OFFICE O/P EST LOW 20 MIN: CPT | Performed by: FAMILY MEDICINE

## 2020-06-08 PROCEDURE — 3008F BODY MASS INDEX DOCD: CPT | Performed by: FAMILY MEDICINE

## 2020-06-08 PROCEDURE — 1036F TOBACCO NON-USER: CPT | Performed by: FAMILY MEDICINE

## 2020-06-08 PROCEDURE — 2022F DILAT RTA XM EVC RTNOPTHY: CPT | Performed by: FAMILY MEDICINE

## 2020-06-08 RX ORDER — EMPAGLIFLOZIN 10 MG/1
10 TABLET, FILM COATED ORAL DAILY
Qty: 90 TABLET | Refills: 1 | Status: SHIPPED | OUTPATIENT
Start: 2020-06-08 | End: 2021-07-26 | Stop reason: SDUPTHER

## 2020-06-08 RX ORDER — KETOCONAZOLE 20 MG/G
CREAM TOPICAL 2 TIMES DAILY
Qty: 60 G | Refills: 3 | Status: SHIPPED | OUTPATIENT
Start: 2020-06-08

## 2020-06-08 RX ORDER — SITAGLIPTIN AND METFORMIN HYDROCHLORIDE 500; 50 MG/1; MG/1
1 TABLET, FILM COATED ORAL 2 TIMES DAILY WITH MEALS
Qty: 180 TABLET | Refills: 1 | Status: SHIPPED | OUTPATIENT
Start: 2020-06-08 | End: 2020-12-15

## 2020-06-08 RX ORDER — MONTELUKAST SODIUM 10 MG/1
10 TABLET ORAL DAILY
Qty: 90 TABLET | Refills: 1 | Status: SHIPPED | OUTPATIENT
Start: 2020-06-08 | End: 2021-01-19

## 2020-06-08 RX ORDER — SIMVASTATIN 40 MG
40 TABLET ORAL DAILY
Qty: 90 TABLET | Refills: 1 | Status: SHIPPED | OUTPATIENT
Start: 2020-06-08 | End: 2021-01-19

## 2020-10-07 ENCOUNTER — OFFICE VISIT (OUTPATIENT)
Dept: FAMILY MEDICINE CLINIC | Facility: CLINIC | Age: 55
End: 2020-10-07
Payer: COMMERCIAL

## 2020-10-07 VITALS
BODY MASS INDEX: 24.98 KG/M2 | SYSTOLIC BLOOD PRESSURE: 110 MMHG | TEMPERATURE: 98.2 F | RESPIRATION RATE: 18 BRPM | HEIGHT: 63 IN | DIASTOLIC BLOOD PRESSURE: 78 MMHG | WEIGHT: 141 LBS

## 2020-10-07 DIAGNOSIS — L03.115 CELLULITIS OF RIGHT LOWER EXTREMITY: Primary | ICD-10-CM

## 2020-10-07 DIAGNOSIS — S80.861A INSECT BITE OF RIGHT LOWER LEG, INITIAL ENCOUNTER: ICD-10-CM
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
## 2020-10-07 PROCEDURE — 3725F SCREEN DEPRESSION PERFORMED: CPT | Performed by: PHYSICIAN ASSISTANT

## 2020-10-07 PROCEDURE — 99213 OFFICE O/P EST LOW 20 MIN: CPT | Performed by: PHYSICIAN ASSISTANT

## 2020-10-07 RX ORDER — CEPHALEXIN 500 MG/1
500 CAPSULE ORAL 3 TIMES DAILY
COMMUNITY
Start: 2020-10-02 | End: 2020-10-07

## 2020-10-14 ENCOUNTER — OFFICE VISIT (OUTPATIENT)
Dept: FAMILY MEDICINE CLINIC | Facility: CLINIC | Age: 55
End: 2020-10-14
Payer: COMMERCIAL

## 2020-10-14 VITALS
BODY MASS INDEX: 24.1 KG/M2 | HEIGHT: 63 IN | WEIGHT: 136 LBS | DIASTOLIC BLOOD PRESSURE: 68 MMHG | HEART RATE: 68 BPM | SYSTOLIC BLOOD PRESSURE: 118 MMHG

## 2020-10-14 DIAGNOSIS — H53.30 BINOCULAR VISION DISORDER: ICD-10-CM

## 2020-10-14 DIAGNOSIS — S06.0X9D CONCUSSION WITH LOSS OF CONSCIOUSNESS, SUBSEQUENT ENCOUNTER: Primary | ICD-10-CM

## 2020-10-14 DIAGNOSIS — E11.9 TYPE 2 DIABETES MELLITUS WITHOUT COMPLICATION, WITHOUT LONG-TERM CURRENT USE OF INSULIN (HCC): ICD-10-CM

## 2020-10-14 PROBLEM — S06.0X9A CONCUSSION WITH LOSS OF CONSCIOUSNESS: Status: ACTIVE | Noted: 2020-10-14

## 2020-10-14 PROCEDURE — 99214 OFFICE O/P EST MOD 30 MIN: CPT | Performed by: NURSE PRACTITIONER

## 2020-10-14 PROCEDURE — 1036F TOBACCO NON-USER: CPT | Performed by: NURSE PRACTITIONER

## 2020-10-16 ENCOUNTER — TELEPHONE (OUTPATIENT)
Dept: FAMILY MEDICINE CLINIC | Facility: CLINIC | Age: 55
End: 2020-10-16

## 2020-10-19 ENCOUNTER — TELEPHONE (OUTPATIENT)
Dept: FAMILY MEDICINE CLINIC | Facility: CLINIC | Age: 55
End: 2020-10-19

## 2020-12-10 ENCOUNTER — OFFICE VISIT (OUTPATIENT)
Dept: FAMILY MEDICINE CLINIC | Facility: CLINIC | Age: 55
End: 2020-12-10
Payer: COMMERCIAL

## 2020-12-10 VITALS
HEIGHT: 63 IN | SYSTOLIC BLOOD PRESSURE: 124 MMHG | DIASTOLIC BLOOD PRESSURE: 72 MMHG | BODY MASS INDEX: 24.98 KG/M2 | TEMPERATURE: 97.8 F | WEIGHT: 141 LBS | HEART RATE: 64 BPM

## 2020-12-10 DIAGNOSIS — T16.2XXA FOREIGN BODY OF LEFT EAR, INITIAL ENCOUNTER: Primary | ICD-10-CM

## 2020-12-10 PROCEDURE — 99212 OFFICE O/P EST SF 10 MIN: CPT | Performed by: PHYSICIAN ASSISTANT

## 2020-12-10 PROCEDURE — 69210 REMOVE IMPACTED EAR WAX UNI: CPT | Performed by: PHYSICIAN ASSISTANT

## 2020-12-10 PROCEDURE — 3008F BODY MASS INDEX DOCD: CPT | Performed by: NURSE PRACTITIONER

## 2020-12-15 DIAGNOSIS — E11.9 TYPE 2 DIABETES MELLITUS WITHOUT COMPLICATION, WITHOUT LONG-TERM CURRENT USE OF INSULIN (HCC): ICD-10-CM

## 2020-12-15 RX ORDER — SITAGLIPTIN AND METFORMIN HYDROCHLORIDE 500; 50 MG/1; MG/1
TABLET, FILM COATED ORAL
Qty: 180 TABLET | Refills: 1 | Status: SHIPPED | OUTPATIENT
Start: 2020-12-15 | End: 2021-07-22

## 2020-12-21 ENCOUNTER — TELEMEDICINE (OUTPATIENT)
Dept: FAMILY MEDICINE CLINIC | Facility: CLINIC | Age: 55
End: 2020-12-21
Payer: COMMERCIAL

## 2020-12-21 DIAGNOSIS — B34.9 VIRAL INFECTION, UNSPECIFIED: Primary | ICD-10-CM

## 2020-12-21 DIAGNOSIS — B34.9 VIRAL INFECTION, UNSPECIFIED: ICD-10-CM

## 2020-12-21 PROCEDURE — 99213 OFFICE O/P EST LOW 20 MIN: CPT | Performed by: NURSE PRACTITIONER

## 2020-12-21 PROCEDURE — U0003 INFECTIOUS AGENT DETECTION BY NUCLEIC ACID (DNA OR RNA); SEVERE ACUTE RESPIRATORY SYNDROME CORONAVIRUS 2 (SARS-COV-2) (CORONAVIRUS DISEASE [COVID-19]), AMPLIFIED PROBE TECHNIQUE, MAKING USE OF HIGH THROUGHPUT TECHNOLOGIES AS DESCRIBED BY CMS-2020-01-R: HCPCS | Performed by: NURSE PRACTITIONER

## 2020-12-22 LAB — SARS-COV-2 RNA SPEC QL NAA+PROBE: NOT DETECTED

## 2021-01-15 ENCOUNTER — OFFICE VISIT (OUTPATIENT)
Dept: FAMILY MEDICINE CLINIC | Facility: CLINIC | Age: 56
End: 2021-01-15
Payer: COMMERCIAL

## 2021-01-15 VITALS
BODY MASS INDEX: 24.8 KG/M2 | TEMPERATURE: 97 F | HEIGHT: 63 IN | SYSTOLIC BLOOD PRESSURE: 102 MMHG | DIASTOLIC BLOOD PRESSURE: 60 MMHG | RESPIRATION RATE: 16 BRPM | WEIGHT: 140 LBS | HEART RATE: 60 BPM

## 2021-01-15 DIAGNOSIS — E11.9 TYPE 2 DIABETES MELLITUS WITHOUT COMPLICATION, WITHOUT LONG-TERM CURRENT USE OF INSULIN (HCC): ICD-10-CM

## 2021-01-15 DIAGNOSIS — E78.2 MIXED HYPERLIPIDEMIA: Primary | ICD-10-CM

## 2021-01-15 DIAGNOSIS — E11.9 TYPE 2 DIABETES MELLITUS WITHOUT COMPLICATION, WITHOUT LONG-TERM CURRENT USE OF INSULIN (HCC): Primary | ICD-10-CM

## 2021-01-15 DIAGNOSIS — Z12.5 SCREENING FOR PROSTATE CANCER: ICD-10-CM

## 2021-01-15 PROBLEM — H91.90 DEAF: Status: ACTIVE | Noted: 2021-01-15

## 2021-01-15 PROCEDURE — 99213 OFFICE O/P EST LOW 20 MIN: CPT | Performed by: FAMILY MEDICINE

## 2021-01-15 PROCEDURE — 1036F TOBACCO NON-USER: CPT | Performed by: FAMILY MEDICINE

## 2021-01-15 PROCEDURE — 3008F BODY MASS INDEX DOCD: CPT | Performed by: FAMILY MEDICINE

## 2021-01-15 NOTE — PROGRESS NOTES
Assessment and Plan:    Problem List Items Addressed This Visit        Endocrine    Type 2 diabetes mellitus ([facility] [address] )       Lab Results   Component Value Date    HGBA1C 6 9 (H) 10/12/2020   sugar is stable         Relevant Orders    Microalbumin / creatinine urine ratio       Other    Hyperlipidemia - Primary     stable lipids         Relevant Orders    Lipid panel      Other Visit Diagnoses     Screening for prostate cancer        Relevant Orders    PSA, total and free                 Diagnoses and all orders for this visit:    Mixed hyperlipidemia  -     Lipid panel; Future    Type 2 diabetes mellitus without complication, without long-term current use of insulin ([facility]CC)  -     Microalbumin / creatinine urine ratio    Screening for prostate cancer  -     PSA, total and free; Future              Subjective:      Patient ID: Magalie Gusman is a 54 y o  male  CC:    Chief Complaint   Patient presents with    Follow-up     pt here for a follow up and wants labs done  ILEANA Polo       HPI:    Follow up diabetes, lipids, feels well, no chest pain, headaches on and off due to concussion from  Fall, no shortness of breath      The following portions of the patient's history were reviewed and updated as appropriate: allergies, current medications, past family history, past medical history, past social history, past surgical history and problem list    Review of Systems   Constitutional: Negative for activity change, appetite change, fatigue and unexpected weight change  Respiratory: Negative for shortness of breath  Neurological: Positive for dizziness and headaches  Psychiatric/Behavioral: The patient is not nervous/anxious            Data to review:       Objective:    Vitals:    01/15/21 0848   BP: 102/60   BP Location: Left arm   Patient Position: Sitting   Cuff Size: Standard   Pulse: 60   Resp: 16   Temp: (!) 97 °F (36 1 °C)   TempSrc: Temporal   Weight: 63 5 kg (140 lb)   Height: 5' 3" (1 6 m)        Physical Exam  Vitals signs reviewed  Constitutional:       Appearance: Normal appearance  Neck:      Vascular: No carotid bruit  Cardiovascular:      Rate and Rhythm: Normal rate and regular rhythm  Pulses: Normal pulses  Heart sounds: Normal heart sounds  Pulmonary:      Effort: Pulmonary effort is normal       Breath sounds: Normal breath sounds  Musculoskeletal:      Right lower leg: No edema  Left lower leg: No edema  Lymphadenopathy:      Cervical: No cervical adenopathy  Neurological:      Mental Status: He is alert

## 2021-01-19 DIAGNOSIS — J30.89 ALLERGIC RHINITIS DUE TO OTHER ALLERGIC TRIGGER, UNSPECIFIED SEASONALITY: ICD-10-CM

## 2021-01-19 DIAGNOSIS — E78.5 HYPERLIPIDEMIA, UNSPECIFIED HYPERLIPIDEMIA TYPE: ICD-10-CM

## 2021-01-19 RX ORDER — SIMVASTATIN 40 MG
TABLET ORAL
Qty: 90 TABLET | Refills: 1 | Status: SHIPPED | OUTPATIENT
Start: 2021-01-19 | End: 2021-07-26 | Stop reason: SDUPTHER

## 2021-01-19 RX ORDER — MONTELUKAST SODIUM 10 MG/1
TABLET ORAL
Qty: 90 TABLET | Refills: 1 | Status: SHIPPED | OUTPATIENT
Start: 2021-01-19 | End: 2021-07-26 | Stop reason: SDUPTHER

## 2021-02-07 LAB
CREAT ?TM UR-SCNC: 106 UMOL/L
EXT MICROALBUMIN URINE RANDOM: 0.9
MICROALBUMIN/CREAT UR: 8.5 MG/G{CREAT}

## 2021-02-16 ENCOUNTER — TELEPHONE (OUTPATIENT)
Dept: FAMILY MEDICINE CLINIC | Facility: CLINIC | Age: 56
End: 2021-02-16

## 2021-02-16 NOTE — TELEPHONE ENCOUNTER
Pt called via  for BW results  There is documentation that urine results were given, but no documentation for the remainder of his blood work  Please review labs in media

## 2021-02-19 ENCOUNTER — TELEPHONE (OUTPATIENT)
Dept: OTHER | Facility: OTHER | Age: 56
End: 2021-02-19

## 2021-02-19 ENCOUNTER — TELEPHONE (OUTPATIENT)
Dept: FAMILY MEDICINE CLINIC | Facility: CLINIC | Age: 56
End: 2021-02-19

## 2021-02-19 NOTE — TELEPHONE ENCOUNTER
Documentation 2 times that pt was noticed via message that his Urine was Normal and there are NO BW results to review  I lmom to call back to clarify what the message this morning was in regards to that we have NO other results

## 2021-04-21 LAB — HBA1C MFR BLD HPLC: 6.4 %

## 2021-07-26 ENCOUNTER — OFFICE VISIT (OUTPATIENT)
Dept: FAMILY MEDICINE CLINIC | Facility: CLINIC | Age: 56
End: 2021-07-26
Payer: COMMERCIAL

## 2021-07-26 VITALS
WEIGHT: 142 LBS | SYSTOLIC BLOOD PRESSURE: 120 MMHG | HEART RATE: 64 BPM | HEIGHT: 63 IN | DIASTOLIC BLOOD PRESSURE: 68 MMHG | BODY MASS INDEX: 25.16 KG/M2

## 2021-07-26 DIAGNOSIS — R21 SKIN RASH: Primary | ICD-10-CM

## 2021-07-26 DIAGNOSIS — E11.9 TYPE 2 DIABETES MELLITUS WITHOUT COMPLICATION, WITHOUT LONG-TERM CURRENT USE OF INSULIN (HCC): ICD-10-CM

## 2021-07-26 DIAGNOSIS — E78.5 HYPERLIPIDEMIA, UNSPECIFIED HYPERLIPIDEMIA TYPE: ICD-10-CM

## 2021-07-26 DIAGNOSIS — M54.50 CHRONIC BILATERAL LOW BACK PAIN WITHOUT SCIATICA: ICD-10-CM

## 2021-07-26 DIAGNOSIS — G89.29 CHRONIC BILATERAL LOW BACK PAIN WITHOUT SCIATICA: ICD-10-CM

## 2021-07-26 DIAGNOSIS — J30.89 ALLERGIC RHINITIS DUE TO OTHER ALLERGIC TRIGGER, UNSPECIFIED SEASONALITY: ICD-10-CM

## 2021-07-26 PROCEDURE — 3008F BODY MASS INDEX DOCD: CPT | Performed by: FAMILY MEDICINE

## 2021-07-26 PROCEDURE — 99214 OFFICE O/P EST MOD 30 MIN: CPT | Performed by: FAMILY MEDICINE

## 2021-07-26 PROCEDURE — 1036F TOBACCO NON-USER: CPT | Performed by: FAMILY MEDICINE

## 2021-07-26 RX ORDER — EMPAGLIFLOZIN 10 MG/1
10 TABLET, FILM COATED ORAL DAILY
Qty: 90 TABLET | Refills: 1 | Status: SHIPPED | OUTPATIENT
Start: 2021-07-26

## 2021-07-26 RX ORDER — MONTELUKAST SODIUM 10 MG/1
10 TABLET ORAL DAILY
Qty: 90 TABLET | Refills: 1 | Status: SHIPPED | OUTPATIENT
Start: 2021-07-26 | End: 2022-05-03

## 2021-07-26 RX ORDER — KETOCONAZOLE 20 MG/ML
1 SHAMPOO TOPICAL 2 TIMES WEEKLY
Qty: 120 ML | Refills: 6 | Status: SHIPPED | OUTPATIENT
Start: 2021-07-26 | End: 2022-01-26 | Stop reason: ALTCHOICE

## 2021-07-26 RX ORDER — PIMECROLIMUS 10 MG/G
CREAM TOPICAL
COMMUNITY
Start: 2021-05-14 | End: 2021-07-26 | Stop reason: SDUPTHER

## 2021-07-26 RX ORDER — SIMVASTATIN 40 MG
40 TABLET ORAL DAILY
Qty: 90 TABLET | Refills: 1 | Status: SHIPPED | OUTPATIENT
Start: 2021-07-26 | End: 2022-01-31

## 2021-07-26 RX ORDER — PIMECROLIMUS 10 MG/G
CREAM TOPICAL 2 TIMES DAILY
Qty: 30 G | Refills: 6 | Status: SHIPPED | OUTPATIENT
Start: 2021-07-26

## 2021-07-26 RX ORDER — KETOCONAZOLE 20 MG/ML
SHAMPOO TOPICAL
COMMUNITY
Start: 2021-05-14 | End: 2021-07-26 | Stop reason: SDUPTHER

## 2021-07-26 RX ORDER — SITAGLIPTIN AND METFORMIN HYDROCHLORIDE 500; 50 MG/1; MG/1
1 TABLET, FILM COATED ORAL 2 TIMES DAILY WITH MEALS
Qty: 180 TABLET | Refills: 0 | Status: SHIPPED | OUTPATIENT
Start: 2021-07-26 | End: 2021-11-26

## 2021-07-26 NOTE — PROGRESS NOTES
Assessment and Plan:    Problem List Items Addressed This Visit        Endocrine    Type 2 diabetes mellitus ([facility] [address] )       Lab Results   Component Value Date    HGBA1C 6 9 (H) 10/12/2020   endocrine checked in their office, level 6 3, doing well         Relevant Medications    Jardiance 10 MG TABS    Janumet  MG per tablet    glucose blood test strip    Other Relevant Orders    Lancets       Respiratory    Allergic rhinitis    Relevant Medications    montelukast (SINGULAIR) 10 mg tablet       Other    Bilateral low back pain without sciatica     Handicapped placard todat         Hyperlipidemia    Relevant Medications    simvastatin (ZOCOR) 40 mg tablet      Other Visit Diagnoses     Skin rash    -  Primary    Relevant Medications    pimecrolimus (ELIDEL) 1 % cream    ketoconazole (NIZORAL) 2 % shampoo                 Diagnoses and all orders for this visit:    Skin rash  -     pimecrolimus (ELIDEL) 1 % cream; Apply topically 2 (two) times a day  -     ketoconazole (NIZORAL) 2 % shampoo; Apply 1 application topically 2 (two) times a week    Allergic rhinitis due to other allergic trigger, unspecified seasonality  -     montelukast (SINGULAIR) 10 mg tablet; Take 1 tablet (10 mg total) by mouth daily    Hyperlipidemia, unspecified hyperlipidemia type  -     simvastatin (ZOCOR) 40 mg tablet; Take 1 tablet (40 mg total) by mouth daily    Type 2 diabetes mellitus without complication, without long-term current use of insulin ([facility]CC)  -     Jardiance 10 MG TABS; Take 1 tablet (10 mg total) by mouth daily  -     Janumet  MG per tablet;  Take 1 tablet by mouth 2 (two) times a day with meals  -     glucose blood test strip; Use 1 each 2 (two) times a day Use as instructed  -     Lancets    Chronic bilateral low back pain without sciatica    Other orders  -     Discontinue: ketoconazole (NIZORAL) 2 % shampoo; USE 3 TIMES A WEEK TO SCALP AND FACE  -     Discontinue: pimecrolimus (ELIDEL) 1 % cream; APPLY TWICE A DAY TO AFFECTED AREAS OF FACE AND FOREHEAD, AS NEEDED  Subjective:      Patient ID: Haylie Perez is a 64 y o  male  CC:    Chief Complaint   Patient presents with    Follow-up     patient is here for a 6 month f/u re: chronic medical conditions  patient need parking placard filled out  patient needs Left ear item extraction (unsure what)  ak       HPI:    Here for bp check, endo checked hba1c and it was 6 3, needs handicapped placard, felt foreign body l ear like last time but now hearing better       The following portions of the patient's history were reviewed and updated as appropriate: allergies, current medications, past family history, past medical history, past social history, past surgical history and problem list       Review of Systems   Constitutional: Negative for activity change, appetite change and fatigue  HENT: Positive for congestion  Eyes: Negative for visual disturbance  Respiratory: Negative for cough and shortness of breath  Cardiovascular: Negative for chest pain, palpitations and leg swelling  Gastrointestinal: Negative for abdominal pain  Musculoskeletal: Positive for back pain  Negative for arthralgias and neck pain  Neurological: Positive for dizziness, light-headedness and headaches  Negative for weakness  Hematological: Negative for adenopathy  Psychiatric/Behavioral: Positive for confusion and sleep disturbance  The patient is nervous/anxious  5-6 hours- goes to bathroom but does get back to sleep, works different shifts         Data to review:       Objective:    Vitals:    07/26/21 0957   BP: 120/68   Pulse: 64   Weight: 64 4 kg (142 lb)   Height: 5' 3" (1 6 m)        Physical Exam  Vitals reviewed  Constitutional:       Appearance: Normal appearance  Cardiovascular:      Rate and Rhythm: Normal rate and regular rhythm  Pulses: Normal pulses  Heart sounds: Normal heart sounds     Pulmonary:      Effort: Pulmonary effort is normal  Breath sounds: Normal breath sounds  Musculoskeletal:      Right lower leg: No edema  Left lower leg: No edema  Neurological:      Mental Status: He is alert  160.02

## 2021-07-26 NOTE — ASSESSMENT & PLAN NOTE
Lab Results   Component Value Date    HGBA1C 6 9 (H) 10/12/2020   endocrine checked in their office, level 6 3, doing well

## 2021-09-01 ENCOUNTER — TELEPHONE (OUTPATIENT)
Dept: OTHER | Facility: OTHER | Age: 56
End: 2021-09-01

## 2021-09-01 ENCOUNTER — OFFICE VISIT (OUTPATIENT)
Dept: FAMILY MEDICINE CLINIC | Facility: CLINIC | Age: 56
End: 2021-09-01
Payer: COMMERCIAL

## 2021-09-01 VITALS
BODY MASS INDEX: 24.31 KG/M2 | WEIGHT: 137.2 LBS | HEART RATE: 64 BPM | HEIGHT: 63 IN | SYSTOLIC BLOOD PRESSURE: 114 MMHG | DIASTOLIC BLOOD PRESSURE: 68 MMHG

## 2021-09-01 DIAGNOSIS — M54.50 CHRONIC BILATERAL LOW BACK PAIN WITHOUT SCIATICA: Primary | ICD-10-CM

## 2021-09-01 DIAGNOSIS — H91.93 BILATERAL DEAFNESS: ICD-10-CM

## 2021-09-01 DIAGNOSIS — M77.8 TENDONITIS OF ELBOW, LEFT: ICD-10-CM

## 2021-09-01 DIAGNOSIS — G89.29 CHRONIC BILATERAL LOW BACK PAIN WITHOUT SCIATICA: Primary | ICD-10-CM

## 2021-09-01 PROCEDURE — 1036F TOBACCO NON-USER: CPT | Performed by: FAMILY MEDICINE

## 2021-09-01 PROCEDURE — 3008F BODY MASS INDEX DOCD: CPT | Performed by: FAMILY MEDICINE

## 2021-09-01 PROCEDURE — 3074F SYST BP LT 130 MM HG: CPT | Performed by: FAMILY MEDICINE

## 2021-09-01 PROCEDURE — 3078F DIAST BP <80 MM HG: CPT | Performed by: FAMILY MEDICINE

## 2021-09-01 PROCEDURE — 99213 OFFICE O/P EST LOW 20 MIN: CPT | Performed by: FAMILY MEDICINE

## 2021-09-01 RX ORDER — NAPROXEN SODIUM 220 MG
440 TABLET ORAL 2 TIMES DAILY WITH MEALS
Qty: 40 TABLET | Refills: 0
Start: 2021-09-01 | End: 2022-01-26 | Stop reason: ALTCHOICE

## 2021-09-01 NOTE — LETTER
September 1, 2021     Patient: Mohit Ewing   YOB: 1965   Date of Visit: 9/1/2021       To Whom it May Concern:    Mohit Ewing is under my professional care  He was seen in my office on 9/1/2021  He may return to work on 2Sept 2021  Should be out from 30Aug till 1 Sept 2021  If you have any questions or concerns, please don't hesitate to call           Sincerely,          Lo Love MD        CC: No Recipients

## 2021-09-01 NOTE — PROGRESS NOTES
Assessment and Plan:    Problem List Items Addressed This Visit     Bilateral low back pain without sciatica - Primary     Worse with changes in weather  Spasms noted on the right lower back  Again, reasonable for him to not be in work for yesterday and today  Return on the 2nd  Relevant Medications    naproxen sodium (ALEVE) 220 MG tablet    Deaf     Patient was able to communicate with me today using some verbal, clearly he was able to make his needs and medical concerns known  He was able to understand my communication with him as I used M*Modal to dictate and then he could read the message  Tendonitis of elbow, left     Significant discomfort at the left lateral epicondyle  Recommend Aleve 2 pills twice a day  Patient can follow-up in the future as needed  Would recommend ice, heat  Given the weather changes of late, it is likely that he will improve over the next day or so  Would recommend that he stay home yesterday, today, and then return on the 2nd  Relevant Medications    naproxen sodium (ALEVE) 220 MG tablet                 Diagnoses and all orders for this visit:    Chronic bilateral low back pain without sciatica  -     naproxen sodium (ALEVE) 220 MG tablet; Take 2 tablets (440 mg total) by mouth 2 (two) times a day with meals for 10 days    Tendonitis of elbow, left  -     naproxen sodium (ALEVE) 220 MG tablet; Take 2 tablets (440 mg total) by mouth 2 (two) times a day with meals for 10 days    Bilateral deafness              Subjective:      Patient ID: Mohit Ewing is a 64 y o  male  CC:    Chief Complaint   Patient presents with    Arthritis     Pt states he has been having headaches and multiple joint pains  He states this happens at times when the weather changes like this  He would like to be checked out and he needs a note for work from 8/30/2021 through 9/1/2021  He will go back tomorrow         HPI:    Patient is here for some increasing problems with arthritis symptoms, as well as headaches  With the changing in weather lately, he has noted some increasing problems in his low back, left hip and leg, knee, as well as the left arm  This seems to be persistent problems with him, i e  any time weather changes he has more problems  This time, he has noted significant discomfort as above  No real allergy symptoms or cold symptoms, just the discomfort  Of note, the patient is deaf, and we did communicate using verbal skills from him, and M*Modal dictation for him to be able to read notes  Not currently on medications for arthritis  Has not taken any medication for this  The following portions of the patient's history were reviewed and updated as appropriate: allergies, current medications, past family history, past medical history, past social history, past surgical history and problem list       Review of Systems   Constitutional: Positive for activity change and fatigue  HENT: Negative  Respiratory: Positive for apnea  Cardiovascular: Negative  Musculoskeletal: Positive for arthralgias, back pain and myalgias  Neurological: Positive for headaches  Data to review:       Objective:    Vitals:    09/01/21 1206   BP: 114/68   BP Location: Left arm   Patient Position: Sitting   Pulse: 64   Weight: 62 2 kg (137 lb 3 2 oz)   Height: 5' 3" (1 6 m)        Physical Exam  Vitals and nursing note reviewed  Constitutional:       Appearance: Normal appearance  Neck:      Vascular: No carotid bruit  Cardiovascular:      Rate and Rhythm: Normal rate and regular rhythm  Pulses: Normal pulses  Carotid pulses are 2+ on the right side and 2+ on the left side  Heart sounds: Normal heart sounds  No murmur heard  No gallop  Pulmonary:      Effort: Pulmonary effort is normal  No respiratory distress  Breath sounds: Normal breath sounds  No stridor  No wheezing, rhonchi or rales     Chest: Chest wall: No tenderness  Musculoskeletal:      Right elbow: Normal       Left elbow: Tenderness present in lateral epicondyle  Left forearm: Tenderness (Tenderness at the wrist, especially at the ulnar head) present  Back:    Neurological:      Mental Status: He is alert

## 2021-09-01 NOTE — ASSESSMENT & PLAN NOTE
Patient was able to communicate with me today using some verbal, clearly he was able to make his needs and medical concerns known  He was able to understand my communication with him as I used M*Modal to dictate and then he could read the message

## 2021-09-01 NOTE — ASSESSMENT & PLAN NOTE
Worse with changes in weather  Spasms noted on the right lower back  Again, reasonable for him to not be in work for yesterday and today  Return on the 2nd

## 2021-09-01 NOTE — LETTER
September 1, 2021     Patient: Johnanna Goodell   YOB: 1965   Date of Visit: 9/1/2021       To Whom it May Concern:    Johnanna Goodell is under my professional care  He was seen in my office on 9/1/2021  He may return to work on 2Sept2021  Out 31Aug and 1 Sept     If you have any questions or concerns, please don't hesitate to call           Sincerely,          Lokesh Mccauley MD        CC: No Recipients

## 2021-09-01 NOTE — PATIENT INSTRUCTIONS
Problem List Items Addressed This Visit     Bilateral low back pain without sciatica - Primary     Worse with changes in weather  Spasms noted on the right lower back  Again, reasonable for him to not be in work for yesterday and today  Return on the 2nd  Relevant Medications    naproxen sodium (ALEVE) 220 MG tablet    Deaf     Patient was able to communicate with me today using some verbal, clearly he was able to make his needs and medical concerns known  He was able to understand my communication with him as I used M*Modal to dictate and then he could read the message  Tendonitis of elbow, left     Significant discomfort at the left lateral epicondyle  Recommend Aleve 2 pills twice a day  Patient can follow-up in the future as needed  Would recommend ice, heat  Given the weather changes of late, it is likely that he will improve over the next day or so  Would recommend that he stay home yesterday, today, and then return on the 2nd  Relevant Medications    naproxen sodium (ALEVE) 220 MG tablet          COVID 19 Instructions    Natty Corley was advised to limit contact with others to essential tasks such as getting food, medications, and medical care  Proper handwashing reviewed, and Hand sanitzer when washing is not available  If the patient develops symptoms of COVID 19, the patient should call the office as soon as possible  For 1494-3693 Flu season, it is strongly recommended that Flu Vaccinations be obtained  Virtual Visits may be conducted in several ways: Thi: You should get a text message when the provider is ready to see you  Click on the link in the text message, and the call should start  You will need to type in your name, and allow camera and microphone access  This is HIPPA compliant, and secure  Please try to download Google Duo    Once you do download this on your phone, you will be prompted to add your phone number to the account  After that, he should receive a text from [employer], and use that code to verify your phone number  After that, you should be able to use Google Duo to receive and make video calls  Please download Microsoft Teams to your phone or computer  You would get an email with the meeting after scheduling with the office  You will Join the meeting, and wait there till the provider joins as well  Instructions for downloading this are available from the office  This is HIPPA compliant, and secure  Please get immunized to COVID 19  We are committed to getting you vaccinated as soon as possible and will be closely following CDC and [facility]  guidelines as they are released and revised  Please refer to our COVID-19 vaccine webpage for the most up to date information on the vaccine and our distribution efforts  This site will also have the most up to date recommendations for COVID booster vaccine  [employer] tn    OUR NEW LOCATION:  Our new location and phone are:    [address], [address] [place], [place], [address]  Fax: [phone]    Lab services and OB/GYN are at this location as well

## 2021-09-01 NOTE — TELEPHONE ENCOUNTER
Patient calling via interpretor #[id number] just for an appointment for today  Refused triage at this time  He is complining of lower back arthritic pain and wants to be seen  He will also need a work note  Appointment given with Dr Lu Mcknight at [phone] today in the office

## 2021-11-05 ENCOUNTER — TELEPHONE (OUTPATIENT)
Dept: FAMILY MEDICINE CLINIC | Facility: CLINIC | Age: 56
End: 2021-11-05

## 2021-11-08 ENCOUNTER — TELEMEDICINE (OUTPATIENT)
Dept: FAMILY MEDICINE CLINIC | Facility: CLINIC | Age: 56
End: 2021-11-08
Payer: COMMERCIAL

## 2021-11-08 DIAGNOSIS — M79.10 MYALGIA: Primary | ICD-10-CM

## 2021-11-08 DIAGNOSIS — G89.29 CHRONIC BILATERAL LOW BACK PAIN WITHOUT SCIATICA: ICD-10-CM

## 2021-11-08 DIAGNOSIS — M54.50 CHRONIC BILATERAL LOW BACK PAIN WITHOUT SCIATICA: ICD-10-CM

## 2021-11-08 DIAGNOSIS — R51.9 ACUTE NONINTRACTABLE HEADACHE, UNSPECIFIED HEADACHE TYPE: ICD-10-CM

## 2021-11-08 DIAGNOSIS — M25.50 ARTHRALGIA, UNSPECIFIED JOINT: ICD-10-CM

## 2021-11-08 PROCEDURE — 99213 OFFICE O/P EST LOW 20 MIN: CPT | Performed by: FAMILY MEDICINE

## 2021-11-08 PROCEDURE — U0003 INFECTIOUS AGENT DETECTION BY NUCLEIC ACID (DNA OR RNA); SEVERE ACUTE RESPIRATORY SYNDROME CORONAVIRUS 2 (SARS-COV-2) (CORONAVIRUS DISEASE [COVID-19]), AMPLIFIED PROBE TECHNIQUE, MAKING USE OF HIGH THROUGHPUT TECHNOLOGIES AS DESCRIBED BY CMS-2020-01-R: HCPCS | Performed by: FAMILY MEDICINE

## 2021-11-08 PROCEDURE — U0005 INFEC AGEN DETEC AMPLI PROBE: HCPCS | Performed by: FAMILY MEDICINE

## 2021-11-26 ENCOUNTER — OFFICE VISIT (OUTPATIENT)
Dept: FAMILY MEDICINE CLINIC | Facility: CLINIC | Age: 56
End: 2021-11-26
Payer: COMMERCIAL

## 2021-11-26 VITALS
BODY MASS INDEX: 24.98 KG/M2 | SYSTOLIC BLOOD PRESSURE: 120 MMHG | RESPIRATION RATE: 16 BRPM | DIASTOLIC BLOOD PRESSURE: 72 MMHG | WEIGHT: 141 LBS | HEIGHT: 63 IN | HEART RATE: 80 BPM

## 2021-11-26 DIAGNOSIS — E11.9 TYPE 2 DIABETES MELLITUS WITHOUT COMPLICATION, WITHOUT LONG-TERM CURRENT USE OF INSULIN (HCC): ICD-10-CM

## 2021-11-26 DIAGNOSIS — S06.0X9D CONCUSSION WITH LOSS OF CONSCIOUSNESS, SUBSEQUENT ENCOUNTER: Primary | ICD-10-CM

## 2021-11-26 DIAGNOSIS — H91.93 BILATERAL DEAFNESS: ICD-10-CM

## 2021-11-26 DIAGNOSIS — Z71.89 EDUCATED ABOUT COVID-19 VIRUS INFECTION: ICD-10-CM

## 2021-11-26 DIAGNOSIS — H53.30 BINOCULAR VISION DISORDER: ICD-10-CM

## 2021-11-26 DIAGNOSIS — Z23 NEED FOR INFLUENZA VACCINATION: ICD-10-CM

## 2021-11-26 PROCEDURE — 3074F SYST BP LT 130 MM HG: CPT | Performed by: FAMILY MEDICINE

## 2021-11-26 PROCEDURE — 99214 OFFICE O/P EST MOD 30 MIN: CPT | Performed by: FAMILY MEDICINE

## 2021-11-26 PROCEDURE — 3725F SCREEN DEPRESSION PERFORMED: CPT | Performed by: FAMILY MEDICINE

## 2021-11-26 PROCEDURE — 3008F BODY MASS INDEX DOCD: CPT | Performed by: FAMILY MEDICINE

## 2021-11-26 PROCEDURE — 3078F DIAST BP <80 MM HG: CPT | Performed by: FAMILY MEDICINE

## 2021-11-26 PROCEDURE — 1036F TOBACCO NON-USER: CPT | Performed by: FAMILY MEDICINE

## 2021-11-26 PROCEDURE — 90471 IMMUNIZATION ADMIN: CPT | Performed by: FAMILY MEDICINE

## 2021-11-26 PROCEDURE — 90682 RIV4 VACC RECOMBINANT DNA IM: CPT | Performed by: FAMILY MEDICINE

## 2021-11-26 RX ORDER — SITAGLIPTIN AND METFORMIN HYDROCHLORIDE 500; 50 MG/1; MG/1
TABLET, FILM COATED ORAL
Qty: 180 TABLET | Refills: 0 | Status: SHIPPED | OUTPATIENT
Start: 2021-11-26 | End: 2022-01-31

## 2021-12-29 ENCOUNTER — TELEPHONE (OUTPATIENT)
Dept: ADMINISTRATIVE | Facility: OTHER | Age: 56
End: 2021-12-29

## 2021-12-29 ENCOUNTER — OFFICE VISIT (OUTPATIENT)
Dept: FAMILY MEDICINE CLINIC | Facility: CLINIC | Age: 56
End: 2021-12-29
Payer: COMMERCIAL

## 2021-12-29 VITALS
WEIGHT: 141 LBS | HEART RATE: 78 BPM | TEMPERATURE: 98.6 F | SYSTOLIC BLOOD PRESSURE: 126 MMHG | BODY MASS INDEX: 24.98 KG/M2 | HEIGHT: 63 IN | DIASTOLIC BLOOD PRESSURE: 68 MMHG

## 2021-12-29 DIAGNOSIS — S06.0X9D CONCUSSION WITH LOSS OF CONSCIOUSNESS, SUBSEQUENT ENCOUNTER: Primary | ICD-10-CM

## 2021-12-29 DIAGNOSIS — H53.30 BINOCULAR VISION DISORDER: ICD-10-CM

## 2021-12-29 PROBLEM — B34.9 VIRAL INFECTION, UNSPECIFIED: Status: RESOLVED | Noted: 2020-12-21 | Resolved: 2021-12-29

## 2021-12-29 PROCEDURE — 1036F TOBACCO NON-USER: CPT | Performed by: FAMILY MEDICINE

## 2021-12-29 PROCEDURE — 3078F DIAST BP <80 MM HG: CPT | Performed by: FAMILY MEDICINE

## 2021-12-29 PROCEDURE — 3008F BODY MASS INDEX DOCD: CPT | Performed by: FAMILY MEDICINE

## 2021-12-29 PROCEDURE — 99213 OFFICE O/P EST LOW 20 MIN: CPT | Performed by: FAMILY MEDICINE

## 2021-12-29 PROCEDURE — 3074F SYST BP LT 130 MM HG: CPT | Performed by: FAMILY MEDICINE

## 2022-01-26 ENCOUNTER — OFFICE VISIT (OUTPATIENT)
Dept: FAMILY MEDICINE CLINIC | Facility: CLINIC | Age: 57
End: 2022-01-26
Payer: COMMERCIAL

## 2022-01-26 VITALS
DIASTOLIC BLOOD PRESSURE: 70 MMHG | BODY MASS INDEX: 25.34 KG/M2 | TEMPERATURE: 98 F | WEIGHT: 143 LBS | HEIGHT: 63 IN | HEART RATE: 66 BPM | SYSTOLIC BLOOD PRESSURE: 124 MMHG

## 2022-01-26 DIAGNOSIS — Z12.5 SCREENING FOR PROSTATE CANCER: ICD-10-CM

## 2022-01-26 DIAGNOSIS — Z00.00 WELL ADULT EXAM: Primary | ICD-10-CM

## 2022-01-26 PROCEDURE — 3008F BODY MASS INDEX DOCD: CPT | Performed by: FAMILY MEDICINE

## 2022-01-26 PROCEDURE — 99396 PREV VISIT EST AGE 40-64: CPT | Performed by: FAMILY MEDICINE
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
## 2022-04-15 ENCOUNTER — TELEPHONE (OUTPATIENT)
Dept: FAMILY MEDICINE CLINIC | Facility: CLINIC | Age: 57
End: 2022-04-15

## 2022-04-15 NOTE — TELEPHONE ENCOUNTER
LMOM to let patient know that an appointment was set up for Dr Ishmael Hobsb on 5/2/22 at 3:30 for medical clearance for his MRI that is scheduled for 5/5/22  Form is in nursing

## 2022-05-03 DIAGNOSIS — J30.89 ALLERGIC RHINITIS DUE TO OTHER ALLERGIC TRIGGER, UNSPECIFIED SEASONALITY: ICD-10-CM

## 2022-05-03 RX ORDER — MONTELUKAST SODIUM 10 MG/1
TABLET ORAL
Qty: 90 TABLET | Refills: 1 | Status: SHIPPED | OUTPATIENT
Start: 2022-05-03

## 2022-06-17 LAB
CREAT ?TM UR-SCNC: 83.4 UMOL/L
EXT MICROALBUMIN URINE RANDOM: 1
MICROALBUMIN/CREAT UR: 12 MG/G{CREAT}

## 2022-07-12 ENCOUNTER — TELEPHONE (OUTPATIENT)
Dept: ADMINISTRATIVE | Facility: OTHER | Age: 57
End: 2022-07-12

## 2022-07-12 NOTE — TELEPHONE ENCOUNTER
----- Message from [address] sent at 7/11/2022  1:12 PM EDT -----  Regarding: care gap request  07/11/22 1:12 PM    Hello, our patient attached above has had Urine Microalbumin completed/performed  Please assist in updating the patient chart by pulling the Care Everywhere (CE) document  The date of service is 6/17/2022       Thank you,  Amanda Espinal  [facility] PRIMARY CARE

## 2022-07-13 NOTE — TELEPHONE ENCOUNTER
Upon review of the In Basket request we were able to locate, review, and update the patient chart as requested for Urine Microalbumin  Any additional questions or concerns should be emailed to the Practice Liaisons via [email or web address] email, please do not reply via In Basket      Thank you  Zhang Jackson

## 2022-08-05 ENCOUNTER — OFFICE VISIT (OUTPATIENT)
Dept: FAMILY MEDICINE CLINIC | Facility: CLINIC | Age: 57
End: 2022-08-05
Payer: COMMERCIAL

## 2022-08-05 VITALS
SYSTOLIC BLOOD PRESSURE: 120 MMHG | DIASTOLIC BLOOD PRESSURE: 70 MMHG | WEIGHT: 145.4 LBS | HEART RATE: 62 BPM | HEIGHT: 63 IN | BODY MASS INDEX: 25.76 KG/M2

## 2022-08-05 DIAGNOSIS — Z12.5 SCREENING FOR PROSTATE CANCER: ICD-10-CM

## 2022-08-05 DIAGNOSIS — E11.9 TYPE 2 DIABETES MELLITUS WITHOUT COMPLICATION, WITHOUT LONG-TERM CURRENT USE OF INSULIN (HCC): Primary | ICD-10-CM

## 2022-08-05 DIAGNOSIS — E78.2 MIXED HYPERLIPIDEMIA: ICD-10-CM

## 2022-08-05 PROCEDURE — 3078F DIAST BP <80 MM HG: CPT | Performed by: FAMILY MEDICINE

## 2022-08-05 PROCEDURE — 3725F SCREEN DEPRESSION PERFORMED: CPT | Performed by: FAMILY MEDICINE

## 2022-08-05 PROCEDURE — 99213 OFFICE O/P EST LOW 20 MIN: CPT | Performed by: FAMILY MEDICINE

## 2022-08-05 PROCEDURE — 3074F SYST BP LT 130 MM HG: CPT | Performed by: FAMILY MEDICINE

## 2022-08-05 RX ORDER — KETOCONAZOLE 20 MG/ML
SHAMPOO TOPICAL
COMMUNITY
Start: 2022-06-06
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
## 2022-08-08 ENCOUNTER — TELEPHONE (OUTPATIENT)
Dept: ADMINISTRATIVE | Facility: OTHER | Age: 57
End: 2022-08-08

## 2022-08-08 NOTE — LETTER
Diabetic Eye Exam Form    Date Requested: 22  Patient: Romelia Blizzard  Patient : 1965   Referring Provider: Jaya Mulligan MD    DIABETIC Eye Exam Date _______________________________    Type of Exam MUST be documented for Diabetic Eye Exams  Please CHECK ONE  Retinal Exam       Dilated Retinal Exam       OCT       Optomap-Iris Exam      Fundus Photography     Left Eye - Please check Retinopathy AND Type or No Retinopathy      Exam did show retinopathy    Exam did not show retinopathy         Mild     Proliferative           Moderate    Severe            None         Right Eye - Please check Retinopathy AND Type or No Retinopathy     Exam did show retinopathy    Exam did not show retinopathy         Mild     Proliferative        Moderate    Severe        None       Comments __________________________________________________________    Practice Providing Exam ______________________________________________    Exam Performed By (print name) _______________________________________      Provider Signature ___________________________________________________    These reports are needed for  compliance  Please fax this completed form and a copy of the Diabetic Eye Exam report to our office located at [place] [address] as soon as possible via [phone] attention Jenifer Scales: Phone [phone]  We thank you for your assistance in treating our mutual patient

## 2022-08-08 NOTE — TELEPHONE ENCOUNTER
Upon review of the In Basket request and the patient's chart, initial outreach has been made via fax, please see Contacts section for details       Thank you  Michael Myles

## 2022-08-08 NOTE — LETTER
Diabetic Eye Exam Form  Second Attempt    Date Requested: 22  Patient: Clarisse Fuller  Patient : 1965   Referring Provider: J Carlos Batres MD    DIABETIC Eye Exam Date _______________________________    Type of Exam MUST be documented for Diabetic Eye Exams  Please CHECK ONE  Retinal Exam       Dilated Retinal Exam       OCT       Optomap-Iris Exam      Fundus Photography     Left Eye - Please check Retinopathy AND Type or No Retinopathy      Exam did show retinopathy    Exam did not show retinopathy         Mild     Proliferative           Moderate    Severe            None         Right Eye - Please check Retinopathy AND Type or No Retinopathy     Exam did show retinopathy    Exam did not show retinopathy         Mild     Proliferative        Moderate    Severe        None       Comments __________________________________________________________    Practice Providing Exam ______________________________________________    Exam Performed By (print name) _______________________________________      Provider Signature ___________________________________________________    These reports are needed for  compliance  Please fax this completed form and a copy of the Diabetic Eye Exam report to our office located at [place] [address] as soon as possible via [phone] bri Garduno: Phone [phone]  We thank you for your assistance in treating our mutual patient

## 2022-08-08 NOTE — TELEPHONE ENCOUNTER
----- Message from Deng Rivera sent at 8/5/2022  2:52 PM EDT -----  Regarding: Ally Xie Request  08/05/22 2:52 PM    Hello, our patient [place] Cross Eduard has had Diabetic Eye Exam completed/performed  Please assist in updating the patient chart by making an External outreach to [address] [place] [address] located in [facility]  The date of service is about 2 weeks ago      Thank you,  Deng [facility] PRIMARY [facility]ARE

## 2022-08-18 NOTE — TELEPHONE ENCOUNTER
As a follow-up, a second attempt has been made for outreach via fax, please see Contacts section for details      Thank you  Lea Romero

## 2022-08-25 NOTE — TELEPHONE ENCOUNTER
As a final attempt, a third outreach has been made via telephone call  Please see Contacts section for details  This encounter will be closed and completed by end of day  Should we receive the requested information because of previous outreach attempts, the requested patient's chart will be updated appropriately       Thank you  Fariha Banks

## 2022-08-31 DIAGNOSIS — E78.5 HYPERLIPIDEMIA, UNSPECIFIED HYPERLIPIDEMIA TYPE: ICD-10-CM

## 2022-08-31 RX ORDER — SIMVASTATIN 40 MG
TABLET ORAL
Qty: 90 TABLET | Refills: 1 | Status: SHIPPED | OUTPATIENT
Start: 2022-08-31

## 2022-09-16 LAB
LEFT EYE DIABETIC RETINOPATHY: NORMAL
RIGHT EYE DIABETIC RETINOPATHY: NORMAL

## 2022-09-16 PROCEDURE — 2023F DILAT RTA XM W/O RTNOPTHY: CPT | Performed by: FAMILY MEDICINE

## 2022-09-19 NOTE — TELEPHONE ENCOUNTER
Upon review of the In Basket request we were able to locate, review, and update the patient chart as requested for Diabetic Eye Exam     Any additional questions or concerns should be emailed to the Practice Liaisons via [email or web address] email, please do not reply via In Basket      Thank you  Tati Aguila

## 2022-12-07 DIAGNOSIS — J30.89 ALLERGIC RHINITIS DUE TO OTHER ALLERGIC TRIGGER, UNSPECIFIED SEASONALITY: ICD-10-CM

## 2022-12-07 RX ORDER — MONTELUKAST SODIUM 10 MG/1
TABLET ORAL
Qty: 90 TABLET | Refills: 1 | Status: SHIPPED | OUTPATIENT
Start: 2022-12-07

## 2023-02-03 ENCOUNTER — OFFICE VISIT (OUTPATIENT)
Dept: FAMILY MEDICINE CLINIC | Facility: CLINIC | Age: 58
End: 2023-02-03

## 2023-02-03 VITALS
OXYGEN SATURATION: 97 % | BODY MASS INDEX: 26.05 KG/M2 | TEMPERATURE: 98.6 F | HEIGHT: 63 IN | DIASTOLIC BLOOD PRESSURE: 80 MMHG | HEART RATE: 82 BPM | RESPIRATION RATE: 16 BRPM | WEIGHT: 147 LBS | SYSTOLIC BLOOD PRESSURE: 126 MMHG

## 2023-02-03 DIAGNOSIS — M54.50 CHRONIC BILATERAL LOW BACK PAIN WITHOUT SCIATICA: ICD-10-CM

## 2023-02-03 DIAGNOSIS — G89.29 CHRONIC BILATERAL LOW BACK PAIN WITHOUT SCIATICA: ICD-10-CM

## 2023-02-03 DIAGNOSIS — E11.9 TYPE 2 DIABETES MELLITUS WITHOUT COMPLICATION, WITHOUT LONG-TERM CURRENT USE OF INSULIN (HCC): Primary | ICD-10-CM

## 2023-02-03 LAB
CREAT UR-MCNC: 66.7 MG/DL
MICROALBUMIN UR-MCNC: 6.2 MG/L (ref 0–20)
MICROALBUMIN/CREAT 24H UR: 9 MG/G CREATININE (ref 0–30)

## 2023-02-03 NOTE — LETTER
Date: 2/3/2023    To whom it may concern: This is to certify that Raymundo Combs has been under my care for the following diagnosis: deafness and  back pain, should  be permanently  Excused  From jury duty        I feel that Raymundo Combs is unable to serve on [address] [facility] at this time for the above mentioned medical reasons                    Sincerely,  Hannah Fournier MD

## 2023-02-03 NOTE — PROGRESS NOTES
Name: Marilyn Mauricio      : 1965      MRN: [id number]  Encounter Provider: Joann Hyde MD  Encounter Date: 2/3/2023   Encounter department: [facility] PRIMARY CARE    Assessment & Plan     1  Type 2 diabetes mellitus without complication, without long-term current use of insulin (HCC)           Subjective      F u diabetes, lipids, back pain, doing  Well  No cp, no sob, no headaches, needs  ju5ry duty  lettter    Review of Systems   Constitutional: Negative for activity change, appetite change and fatigue  Respiratory: Negative for shortness of breath  Cardiovascular: Negative for chest pain  Musculoskeletal: Positive for back pain  Neurological: Negative for dizziness and headaches  Current Outpatient Medications on File Prior to Visit   Medication Sig   • cyanocobalamin (VITAMIN B-12) 1000 MCG tablet Take 1,000 mcg by mouth daily   • Janumet  MG per tablet TAKE 1 TABLET BY MOUTH TWICE A DAY WITH MEALS   • Jardiance 10 MG TABS Take 1 tablet (10 mg total) by mouth daily   • ketoconazole (NIZORAL) 2 % cream Apply topically 2 (two) times a day   • ketoconazole (NIZORAL) 2 % shampoo    • montelukast (SINGULAIR) 10 mg tablet TAKE 1 TABLET BY MOUTH EVERY DAY   • Multiple Vitamin (MULTIVITAMIN ADULT PO) Take by mouth daily   • Omega-3 Fatty Acids (FISH OIL BURP-LESS) 500 MG CAPS Take 2 g by mouth daily   • pimecrolimus (ELIDEL) 1 % cream Apply topically 2 (two) times a day   • simvastatin (ZOCOR) 40 mg tablet TAKE 1 TABLET BY MOUTH EVERY DAY       Objective     /80 (BP Location: Right arm, Patient Position: Sitting, Cuff Size: Standard)   Pulse 82   Temp 98 6 °F (37 °C) (Tympanic)   Resp 16   Ht 5' 3" (1 6 m)   Wt 66 7 kg (147 lb)   SpO2 97%   BMI 26 04 kg/m²     Physical Exam  Vitals reviewed  Constitutional:       Appearance: Normal appearance  Neck:      Vascular: No carotid bruit  Cardiovascular:      Rate and Rhythm: Normal rate and regular rhythm        Pulses: Normal pulses  Heart sounds: Normal heart sounds  Pulmonary:      Effort: Pulmonary effort is normal       Breath sounds: Normal breath sounds  Musculoskeletal:      Right lower leg: No edema  Left lower leg: Edema present  Lymphadenopathy:      Cervical: No cervical adenopathy  Neurological:      Mental Status: He is alert         Makayla Watkins MD

## 2023-02-06 ENCOUNTER — TELEPHONE (OUTPATIENT)
Dept: FAMILY MEDICINE CLINIC | Facility: CLINIC | Age: 58
End: 2023-02-06

## 2023-02-06 DIAGNOSIS — Z12.5 SCREENING FOR PROSTATE CANCER: Primary | ICD-10-CM

## 2023-02-06 NOTE — TELEPHONE ENCOUNTER
Patient notified of results   Patient will like to know if additional lab work is needed at this time

## 2023-03-11 DIAGNOSIS — E78.5 HYPERLIPIDEMIA, UNSPECIFIED HYPERLIPIDEMIA TYPE: ICD-10-CM

## 2023-03-13 RX ORDER — SIMVASTATIN 40 MG
TABLET ORAL
Qty: 90 TABLET | Refills: 1 | Status: SHIPPED | OUTPATIENT
Start: 2023-03-13

## 2023-05-18 DIAGNOSIS — J30.89 ALLERGIC RHINITIS DUE TO OTHER ALLERGIC TRIGGER, UNSPECIFIED SEASONALITY: ICD-10-CM

## 2023-05-18 RX ORDER — MONTELUKAST SODIUM 10 MG/1
TABLET ORAL
Qty: 90 TABLET | Refills: 1 | Status: SHIPPED | OUTPATIENT
Start: 2023-05-18

## 2023-07-01 NOTE — TELEPHONE ENCOUNTER
----- Message from Jaya Mulligan MD sent at 1/7/2019 11:43 AM EST -----  Mild arthritis hip and shoulder-does pt want to do physical therapy? John Diaz PA-C:

## 2023-08-30 RX ORDER — SODIUM, POTASSIUM,MAG SULFATES 17.5-3.13G
SOLUTION, RECONSTITUTED, ORAL ORAL
COMMUNITY
Start: 2023-07-10

## 2023-09-01 ENCOUNTER — OFFICE VISIT (OUTPATIENT)
Dept: FAMILY MEDICINE CLINIC | Facility: CLINIC | Age: 58
End: 2023-09-01
Payer: COMMERCIAL

## 2023-09-01 VITALS
HEIGHT: 63 IN | OXYGEN SATURATION: 96 % | HEART RATE: 80 BPM | RESPIRATION RATE: 16 BRPM | SYSTOLIC BLOOD PRESSURE: 128 MMHG | WEIGHT: 139 LBS | DIASTOLIC BLOOD PRESSURE: 80 MMHG | BODY MASS INDEX: 24.63 KG/M2

## 2023-09-01 DIAGNOSIS — E11.9 TYPE 2 DIABETES MELLITUS WITHOUT COMPLICATION, WITHOUT LONG-TERM CURRENT USE OF INSULIN (HCC): Primary | ICD-10-CM

## 2023-09-01 DIAGNOSIS — E78.2 MIXED HYPERLIPIDEMIA: ICD-10-CM

## 2023-09-01 PROCEDURE — 99213 OFFICE O/P EST LOW 20 MIN: CPT | Performed by: FAMILY MEDICINE

## 2023-09-01 NOTE — PROGRESS NOTES
Name: Chris Conde      : 1965      MRN: [id number]  Encounter Provider: Joao Christianson MD  Encounter Date: 2023   Encounter department: [facility] PRIMARY CARE    Assessment & Plan     1. Type 2 diabetes mellitus without complication, without long-term current use of insulin (HCC)  Assessment & Plan:    Lab Results   Component Value Date    HGBA1C 6.9 (H) 2023   sugar  Stable  On meds      2. Mixed hyperlipidemia  Assessment & Plan:  Lipids  stable           Subjective      F/u diabetes. Lipids, feels well,a little tired, feet w ere  Bothering  Him after camping trip but   Anjelica Lopes now    Review of Systems   Constitutional: Positive for fatigue. Negative for activity change and appetite change. Respiratory: Negative for shortness of breath. Cardiovascular: Negative for chest pain. Neurological: Positive for headaches. Negative for dizziness and light-headedness. Hematological: Negative for adenopathy. Psychiatric/Behavioral: The patient is not nervous/anxious.         Current Outpatient Medications on File Prior to Visit   Medication Sig   • cyanocobalamin (VITAMIN B-12) 1000 MCG tablet Take 1,000 mcg by mouth daily   • Janumet  MG per tablet TAKE 1 TABLET BY MOUTH TWICE A DAY WITH MEALS   • Jardiance 10 MG TABS Take 1 tablet (10 mg total) by mouth daily   • ketoconazole (NIZORAL) 2 % cream Apply topically 2 (two) times a day (Patient not taking: Reported on 2023)   • ketoconazole (NIZORAL) 2 % shampoo  (Patient not taking: Reported on 2023)   • montelukast (SINGULAIR) 10 mg tablet TAKE 1 TABLET BY MOUTH EVERY DAY   • Multiple Vitamin (MULTIVITAMIN ADULT PO) Take by mouth daily (Patient not taking: Reported on 2023)   • Na Sulfate-K Sulfate-Mg Sulf 17.5-3.13-1.6 GM/177ML SOLN TAKE 1 KIT BY MOUTH SEE ADMINISTRATION INSTRUCTIONS FOLLOW INSTRUCTIONS GIVEN AT OFFICE   • Omega-3 Fatty Acids (FISH OIL BURP-LESS) 500 MG CAPS Take 2 g by mouth daily (Patient not taking: Reported on 5/1/2023)   • pimecrolimus (ELIDEL) 1 % cream Apply topically 2 (two) times a day (Patient not taking: Reported on 5/1/2023)   • simvastatin (ZOCOR) 40 mg tablet TAKE 1 TABLET BY MOUTH EVERY DAY   • Sod Picosulfate-Mag Ox-Cit Acd 10-3.5-12 MG-GM -GM/175ML SOLN Take 1 kit by mouth see administration instructions Follow instructions given at office       Objective     /80 (BP Location: Right arm, Patient Position: Sitting, Cuff Size: Large)   Pulse 80   Resp 16   Ht 5' 3" (1.6 m)   Wt 63 kg (139 lb)   SpO2 96%   BMI 24.62 kg/m²     Physical Exam  Vitals reviewed. Cardiovascular:      Rate and Rhythm: Normal rate and regular rhythm. Pulses: Normal pulses. no weak pulses          Dorsalis pedis pulses are 2+ on the right side and 2+ on the left side. Posterior tibial pulses are 2+ on the right side and 2+ on the left side. Heart sounds: Normal heart sounds. Pulmonary:      Breath sounds: Normal breath sounds. Musculoskeletal:      Left lower leg: No edema. Feet:      Right foot:      Skin integrity: No ulcer, skin breakdown, erythema, warmth, callus or dry skin. Left foot:      Skin integrity: No ulcer, skin breakdown, erythema, warmth, callus or dry skin. Lymphadenopathy:      Cervical: No cervical adenopathy. Neurological:      Mental Status: He is alert. Ambrose Noyola MD     Patient's shoes and socks removed. Right Foot/Ankle   Right Foot Inspection  Skin Exam: skin normal and skin intact. No dry skin, no warmth, no callus, no erythema, no maceration, no abnormal color, no pre-ulcer, no ulcer and no callus. Toe Exam: ROM and strength within normal limits. No swelling, no tenderness, erythema and  no right toe deformity    Sensory   Vibration: intact  Proprioception: intact  Monofilament testing: intact    Vascular  Capillary refills: < 3 seconds  The right DP pulse is 2+. The right PT pulse is 2+.      Left Foot/Ankle  Left Foot Inspection  Skin Exam: skin normal and skin intact. No dry skin, no warmth, no erythema, no maceration, normal color, no pre-ulcer, no ulcer and no callus. Toe Exam: ROM and strength within normal limits. No swelling, no tenderness, no erythema and no left toe deformity. Sensory   Vibration: intact  Proprioception: intact  Monofilament testing: intact    Vascular  Capillary refills: < 3 seconds  The left DP pulse is 2+. The left PT pulse is 2+.      Assign Risk Category  No deformity present  No loss of protective sensation  No weak pulses  Risk: 0 DISPLAY PLAN FREE TEXT

## 2023-09-11 DIAGNOSIS — J30.89 ALLERGIC RHINITIS DUE TO OTHER ALLERGIC TRIGGER, UNSPECIFIED SEASONALITY: ICD-10-CM

## 2023-09-11 RX ORDER — MONTELUKAST SODIUM 10 MG/1
TABLET ORAL
Qty: 90 TABLET | Refills: 1 | Status: SHIPPED | OUTPATIENT
Start: 2023-09-11

## 2023-09-14 DIAGNOSIS — E78.5 HYPERLIPIDEMIA, UNSPECIFIED HYPERLIPIDEMIA TYPE: ICD-10-CM

## 2023-09-14 RX ORDER — SIMVASTATIN 40 MG
TABLET ORAL
Qty: 90 TABLET | Refills: 1 | Status: SHIPPED | OUTPATIENT
Start: 2023-09-14

## 2024-03-08 ENCOUNTER — TELEPHONE (OUTPATIENT)
Dept: FAMILY MEDICINE CLINIC | Facility: CLINIC | Age: 59
End: 2024-03-08

## 2024-03-08 NOTE — TELEPHONE ENCOUNTER
Patient stopped in he needs a note for work allowing him to bring a personal lunch bag on the work floor. Please call him at [phone] that is his cell phone. When note is ready.

## 2024-03-12 DIAGNOSIS — E78.5 HYPERLIPIDEMIA, UNSPECIFIED HYPERLIPIDEMIA TYPE: ICD-10-CM

## 2024-03-12 RX ORDER — SIMVASTATIN 40 MG
TABLET ORAL
Qty: 90 TABLET | Refills: 1 | Status: SHIPPED | OUTPATIENT
Start: 2024-03-12

## 2024-03-13 NOTE — TELEPHONE ENCOUNTER
Pt called back to check on his request for a note for work to allow him to bring a personal lunch bag on the work floor. Pt would like to  when ready. Please advise.

## 2024-03-14 NOTE — TELEPHONE ENCOUNTER
Pt called back and explained he needs the work note because he is diabetic and has trouble with his memory and therefore, needs his snacks and simple tools with him at all times. He occasionally has trouble with remembering things so it will be easier for him to remember to eat or to quickly be able to eat if the bag is near him rather than in a locker. Please advise if this can be done and when he can .

## 2024-04-03 DIAGNOSIS — J30.89 ALLERGIC RHINITIS DUE TO OTHER ALLERGIC TRIGGER, UNSPECIFIED SEASONALITY: ICD-10-CM

## 2024-04-04 RX ORDER — MONTELUKAST SODIUM 10 MG/1
TABLET ORAL
Qty: 90 TABLET | Refills: 1 | Status: SHIPPED | OUTPATIENT
Start: 2024-04-04

## 2024-04-16 ENCOUNTER — OFFICE VISIT (OUTPATIENT)
Dept: FAMILY MEDICINE CLINIC | Facility: CLINIC | Age: 59
End: 2024-04-16
Payer: COMMERCIAL

## 2024-04-16 VITALS
WEIGHT: 146 LBS | SYSTOLIC BLOOD PRESSURE: 112 MMHG | DIASTOLIC BLOOD PRESSURE: 78 MMHG | BODY MASS INDEX: 25.87 KG/M2 | OXYGEN SATURATION: 95 % | HEART RATE: 68 BPM | HEIGHT: 63 IN

## 2024-04-16 DIAGNOSIS — R12 HEARTBURN: Primary | ICD-10-CM

## 2024-04-16 DIAGNOSIS — E11.9 TYPE 2 DIABETES MELLITUS WITHOUT COMPLICATION, WITHOUT LONG-TERM CURRENT USE OF INSULIN (HCC): ICD-10-CM

## 2024-04-16 PROCEDURE — 82570 ASSAY OF URINE CREATININE: CPT | Performed by: FAMILY MEDICINE

## 2024-04-16 PROCEDURE — 82043 UR ALBUMIN QUANTITATIVE: CPT | Performed by: FAMILY MEDICINE

## 2024-04-16 PROCEDURE — 99214 OFFICE O/P EST MOD 30 MIN: CPT | Performed by: FAMILY MEDICINE

## 2024-04-16 NOTE — PROGRESS NOTES
Name: Troy Wadsworth      : 1965      MRN: [id number]  Encounter Provider: Rosette Whitmore MD  Encounter Date: 2024   Encounter department: [facility] PRIMARY CARE    Assessment & Plan     1. Heartburn  Assessment & Plan:  Gerd diet given      2. Type 2 diabetes mellitus without complication, without long-term current use of insulin (HCC)  -     Albumin / creatinine urine ratio; Future  -     Albumin / creatinine urine ratio           Subjective      Patient presents with:  Follow-up: Follow up to chronic conditions.  mjs    Heartburn, no burping  or  belching. Using  Tums , not  doing  heartburn  diet, no n/v,  weight  loss, change  in Bms, did gain 7 lb         Review of Systems   Constitutional:  Negative for activity change, appetite change and fatigue.   Respiratory:  Negative for shortness of breath.    Cardiovascular:  Negative for chest pain.   Gastrointestinal:         Heartburn occ   Neurological:  Negative for dizziness, light-headedness and headaches.       Current Outpatient Medications on File Prior to Visit   Medication Sig    cyanocobalamin (VITAMIN B-12) 1000 MCG tablet Take 1,000 mcg by mouth daily    Janumet  MG per tablet TAKE 1 TABLET BY MOUTH TWICE A DAY WITH MEALS    Jardiance 10 MG TABS Take 1 tablet (10 mg total) by mouth daily    ketoconazole (NIZORAL) 2 % cream Apply topically 2 (two) times a day    ketoconazole (NIZORAL) 2 % shampoo     montelukast (SINGULAIR) 10 mg tablet TAKE 1 TABLET BY MOUTH EVERY DAY    Multiple Vitamin (MULTIVITAMIN ADULT PO) Take by mouth daily    Omega-3 Fatty Acids (FISH OIL BURP-LESS) 500 MG CAPS Take 2 g by mouth daily    simvastatin (ZOCOR) 40 mg tablet TAKE 1 TABLET BY MOUTH EVERY DAY    [DISCONTINUED] Na Sulfate-K Sulfate-Mg Sulf 17.5-3.13-1.6 GM/177ML SOLN TAKE 1 KIT BY MOUTH SEE ADMINISTRATION INSTRUCTIONS FOLLOW INSTRUCTIONS GIVEN AT OFFICE    [DISCONTINUED] pimecrolimus (ELIDEL) 1 % cream Apply topically 2 (two) times a day  "(Patient not taking: Reported on 5/1/2023)    [DISCONTINUED] Sod Picosulfate-Mag Ox-Cit Acd 10-3.5-12 MG-GM -GM/175ML SOLN Take 1 kit by mouth see administration instructions Follow instructions given at office       Objective     /78   Pulse 68   Ht 5' 3\" (1.6 m)   Wt 66.2 kg (146 lb)   SpO2 95%   BMI 25.86 kg/m²     Physical Exam  Vitals reviewed.   Constitutional:       Appearance: Normal appearance.   Neck:      Vascular: No carotid bruit.   Cardiovascular:      Rate and Rhythm: Normal rate and regular rhythm.      Pulses: Normal pulses.      Heart sounds: Normal heart sounds.   Pulmonary:      Effort: Pulmonary effort is normal.      Breath sounds: Normal breath sounds.   Abdominal:      General: Abdomen is flat. Bowel sounds are normal.      Palpations: Abdomen is soft.      Tenderness: There is no abdominal tenderness.   Musculoskeletal:      Right lower leg: No edema.      Left lower leg: No edema.   Lymphadenopathy:      Cervical: No cervical adenopathy.   Neurological:      Mental Status: He is alert.   Psychiatric:         Mood and Affect: Mood normal.       Rosette Whitmore MD    "

## 2024-04-17 LAB
CREAT UR-MCNC: 47.1 MG/DL
MICROALBUMIN UR-MCNC: 10.5 MG/L
MICROALBUMIN/CREAT 24H UR: 22 MG/G CREATININE (ref 0–30)

## 2024-04-22 ENCOUNTER — TELEPHONE (OUTPATIENT)
Age: 59
End: 2024-04-22

## 2024-11-24 DIAGNOSIS — J30.89 ALLERGIC RHINITIS DUE TO OTHER ALLERGIC TRIGGER, UNSPECIFIED SEASONALITY: ICD-10-CM

## 2024-11-26 RX ORDER — MONTELUKAST SODIUM 10 MG/1
10 TABLET ORAL DAILY
Qty: 90 TABLET | Refills: 1 | Status: SHIPPED | OUTPATIENT
Start: 2024-11-26

## 2024-12-16 ENCOUNTER — TELEPHONE (OUTPATIENT)
Age: 59
End: 2024-12-16

## 2024-12-16 NOTE — TELEPHONE ENCOUNTER
Pt called stating that he was returning a call from the office.    In reviewing pt's chart, unable to find an encounter that anyone called pt.    Spoke with Josefa in clerical who also confirmed that she was unable to find an encounter.    Advised pt, if needed the associated who called will call back.

## 2025-01-06 ENCOUNTER — OFFICE VISIT (OUTPATIENT)
Dept: FAMILY MEDICINE CLINIC | Facility: CLINIC | Age: 60
End: 2025-01-06
Payer: COMMERCIAL

## 2025-01-06 VITALS
OXYGEN SATURATION: 93 % | HEART RATE: 83 BPM | DIASTOLIC BLOOD PRESSURE: 72 MMHG | HEIGHT: 63 IN | BODY MASS INDEX: 27.11 KG/M2 | SYSTOLIC BLOOD PRESSURE: 110 MMHG | WEIGHT: 153 LBS

## 2025-01-06 DIAGNOSIS — E78.5 HYPERLIPIDEMIA, UNSPECIFIED HYPERLIPIDEMIA TYPE: ICD-10-CM

## 2025-01-06 DIAGNOSIS — Z12.5 SCREENING FOR PROSTATE CANCER: ICD-10-CM

## 2025-01-06 DIAGNOSIS — E11.9 TYPE 2 DIABETES MELLITUS WITHOUT COMPLICATION, WITHOUT LONG-TERM CURRENT USE OF INSULIN (HCC): Primary | ICD-10-CM

## 2025-01-06 LAB
LEFT EYE DIABETIC RETINOPATHY: NORMAL
LEFT EYE IMAGE QUALITY: NORMAL
LEFT EYE MACULAR EDEMA: NORMAL
LEFT EYE OTHER RETINOPATHY: NORMAL
RIGHT EYE DIABETIC RETINOPATHY: NORMAL
RIGHT EYE IMAGE QUALITY: NORMAL
RIGHT EYE MACULAR EDEMA: NORMAL
RIGHT EYE OTHER RETINOPATHY: NORMAL
SEVERITY (EYE EXAM): NORMAL

## 2025-01-06 PROCEDURE — 99214 OFFICE O/P EST MOD 30 MIN: CPT | Performed by: FAMILY MEDICINE

## 2025-01-06 PROCEDURE — 92250 FUNDUS PHOTOGRAPHY W/I&R: CPT | Performed by: FAMILY MEDICINE

## 2025-01-06 NOTE — ASSESSMENT & PLAN NOTE
Lab Results   Component Value Date    HGBA1C 7.3 (H) 08/02/2024       Orders:    IRIS Diabetic eye exam    CBC and differential  sees  [facility] 2/7, will be doing  lab for them

## 2025-01-06 NOTE — PROGRESS NOTES
"Name: Troy Wadsworth      : 1965      MRN: [id number]  Encounter Provider: Rosette Whitmore MD  Encounter Date: 2025   Encounter department: [facility] PRIMARY CARE  :  Assessment & Plan  Type 2 diabetes mellitus without complication, without long-term current use of insulin (HCC)    Lab Results   Component Value Date    HGBA1C 7.3 (H) 2024       Orders:    IRIS Diabetic eye exam    CBC and differential  sees  endo , will be doing  lab for them  Screening for prostate cancer    Orders:    PSA, Total Screen; Future    Hyperlipidemia, unspecified hyperlipidemia type  On simvastatin, ldl  goal . Ldl 96  on last lab, await  follow  up    Orders:    TSH, 3rd generation           History of Present Illness     Patient presents with:  Follow-up: 6 months follow up    Did injure  l 1st  toe at work  when something  dropped  on it      Review of Systems   Constitutional:  Negative for activity change, appetite change and fatigue.   HENT:  Positive for hearing loss.    Respiratory:  Negative for shortness of breath.    Cardiovascular:  Negative for chest pain.   Skin:  Positive for color change.        Hematoma  l  1st toenail for  3  months, says  toenail starting to grow  out   Neurological:  Positive for headaches. Negative for dizziness and light-headedness.        Occ headaches   Psychiatric/Behavioral:  The patient is not nervous/anxious.        Objective   /72 (BP Location: Left arm, Patient Position: Sitting, Cuff Size: Standard)   Pulse 83   Ht 5' 3\" (1.6 m)   Wt 69.4 kg (153 lb)   SpO2 93%   BMI 27.10 kg/m²      Physical Exam  Vitals reviewed.   Constitutional:       Appearance: Normal appearance.   Neck:      Vascular: No carotid bruit.   Cardiovascular:      Rate and Rhythm: Normal rate and regular rhythm.      Pulses: Normal pulses.      Heart sounds: Normal heart sounds.   Pulmonary:      Effort: Pulmonary effort is normal.   Lymphadenopathy:      Cervical: No cervical " adenopathy.   Skin:     Findings: Bruising present.      Comments: Hematoma  l 1st  toenail  , cristian  1/2  nailbed  black   Neurological:      Mental Status: He is alert.   Psychiatric:         Mood and Affect: Mood normal.

## 2025-01-06 NOTE — ASSESSMENT & PLAN NOTE
Lab Results   Component Value Date    HGBA1C 7.3 (H) 08/02/2024   On janumet  and Jardiance, await  alb

## 2025-01-06 NOTE — ASSESSMENT & PLAN NOTE
On simvastatin, ldl  goal . Ldl 96  on last lab, await  follow  up    Orders:    TSH, 3rd generation

## 2025-01-06 NOTE — PROGRESS NOTES
Diabetic Foot Exam    Patient's shoes and socks removed.    Right Foot/Ankle   Right Foot Inspection  Skin Exam: skin normal and skin intact. No dry skin, no warmth, no callus, no erythema, no maceration, no abnormal color, no pre-ulcer, no ulcer and no callus.     Toe Exam: ROM and strength within normal limits.     Sensory   Monofilament testing: intact    Vascular  Capillary refills: < 3 seconds  The right DP pulse is 2+. The right PT pulse is 2+.     Left Foot/Ankle  Left Foot Inspection  Skin Exam: skin normal and skin intact. No dry skin, no warmth, no erythema, no maceration, normal color, no pre-ulcer, no ulcer and no callus.     Toe Exam: ROM and strength within normal limits and left toe deformity.     Sensory   Monofilament testing: intact    Vascular  Capillary refills: < 3 seconds  The left DP pulse is 2+. The left PT pulse is 2+.     Assign Risk Category  Deformity present  No loss of protective sensation  No weak pulses  Risk: 0  L  1st  toenail with hematoma  from  3  months a go, work accident

## 2025-01-07 ENCOUNTER — RESULTS FOLLOW-UP (OUTPATIENT)
Dept: FAMILY MEDICINE CLINIC | Facility: CLINIC | Age: 60
End: 2025-01-07

## 2025-04-07 ENCOUNTER — TELEPHONE (OUTPATIENT)
Age: 60
End: 2025-04-07

## 2025-04-07 DIAGNOSIS — E11.9 TYPE 2 DIABETES MELLITUS WITHOUT COMPLICATION, WITHOUT LONG-TERM CURRENT USE OF INSULIN (HCC): ICD-10-CM

## 2025-04-07 RX ORDER — SITAGLIPTIN AND METFORMIN HYDROCHLORIDE 500; 50 MG/1; MG/1
1 TABLET, FILM COATED ORAL 2 TIMES DAILY WITH MEALS
Qty: 180 TABLET | Refills: 1 | Status: SHIPPED | OUTPATIENT
Start: 2025-04-07

## 2025-04-07 NOTE — TELEPHONE ENCOUNTER
Patient called in regards to wanting to know if provider has any samples for Janument 50/ 500 taking medication twice a day. Patient stated that he is waiting on insurance to approval medication and his Endo does not have samples up medication. Patient would like a call back.

## 2025-05-26 DIAGNOSIS — J30.89 ALLERGIC RHINITIS DUE TO OTHER ALLERGIC TRIGGER, UNSPECIFIED SEASONALITY: ICD-10-CM

## 2025-05-26 RX ORDER — MONTELUKAST SODIUM 10 MG/1
10 TABLET ORAL DAILY
Qty: 90 TABLET | Refills: 1 | Status: SHIPPED | OUTPATIENT
Start: 2025-05-26

## 2025-07-16 LAB
BASOPHILS # BLD AUTO: 0.1 THOU/CMM (ref 0–0.1)
BASOPHILS NFR BLD AUTO: 1 %
DIFFERENTIAL METHOD BLD: NORMAL
EOSINOPHIL # BLD AUTO: 0.5 THOU/CMM (ref 0–0.5)
EOSINOPHIL NFR BLD AUTO: 7 %
ERYTHROCYTE [DISTWIDTH] IN BLOOD BY AUTOMATED COUNT: 13 % (ref 12–16)
HCT VFR BLD AUTO: 46.2 % (ref 37–48)
HGB BLD-MCNC: 15.7 G/DL (ref 12.5–17)
LYMPHOCYTES # BLD AUTO: 2.3 THOU/CMM (ref 1–3)
LYMPHOCYTES NFR BLD AUTO: 28 %
MCH RBC QN AUTO: 31.1 PG (ref 27–36)
MCHC RBC AUTO-ENTMCNC: 33.9 G/DL (ref 32–37)
MCV RBC AUTO: 92 FL (ref 80–100)
MONOCYTES # BLD AUTO: 0.8 THOU/CMM (ref 0.3–1)
MONOCYTES NFR BLD AUTO: 10 %
NEUTROPHILS # BLD AUTO: 4.4 THOU/CMM (ref 1.8–7.8)
NEUTROPHILS NFR BLD AUTO: 54 %
PLATELET # BLD AUTO: 241 THOU/CMM (ref 140–350)
PMV BLD REES-ECKER: 8.4 FL (ref 7.5–11.3)
PSA SERPL-MCNC: 2.36 NG/ML
RBC # BLD AUTO: 5.04 MILL/CMM (ref 4–5.4)
TSH SERPL-ACNC: 1.33 UIU/ML (ref 0.45–5.33)
WBC # BLD AUTO: 8.2 THOU/CMM (ref 4–10.5)

## 2025-07-21 ENCOUNTER — OFFICE VISIT (OUTPATIENT)
Dept: FAMILY MEDICINE CLINIC | Facility: CLINIC | Age: 60
End: 2025-07-21
Payer: COMMERCIAL

## 2025-07-21 VITALS
SYSTOLIC BLOOD PRESSURE: 120 MMHG | TEMPERATURE: 98 F | BODY MASS INDEX: 26.72 KG/M2 | RESPIRATION RATE: 16 BRPM | HEIGHT: 63 IN | OXYGEN SATURATION: 96 % | HEART RATE: 61 BPM | WEIGHT: 150.8 LBS | DIASTOLIC BLOOD PRESSURE: 68 MMHG

## 2025-07-21 DIAGNOSIS — Z00.00 ANNUAL PHYSICAL EXAM: Primary | ICD-10-CM

## 2025-07-21 DIAGNOSIS — E11.9 TYPE 2 DIABETES MELLITUS WITHOUT COMPLICATION, WITHOUT LONG-TERM CURRENT USE OF INSULIN (HCC): ICD-10-CM

## 2025-07-21 LAB
ALBUMIN/CREAT UR: 20.7
CREAT UR-MCNC: 43.5 MG/DL (ref 50–200)
MICROALBUMIN UR-MCNC: 0.9 MG/DL

## 2025-07-21 PROCEDURE — 99396 PREV VISIT EST AGE 40-64: CPT | Performed by: FAMILY MEDICINE

## 2025-07-21 PROCEDURE — 99213 OFFICE O/P EST LOW 20 MIN: CPT | Performed by: FAMILY MEDICINE

## 2025-07-21 NOTE — PATIENT INSTRUCTIONS
"Patient Education     Routine physical for adults   The Basics   Written by the doctors and editors at [facility]   What is a physical? -- A physical is a routine visit, or \"check-up,\" with your doctor. You might also hear it called a \"wellness visit\" or \"preventive visit.\"  During each visit, the doctor will:   Ask about your physical and mental health   Ask about your habits, behaviors, and lifestyle   Do an exam   Give you vaccines if needed   Talk to you about any medicines you take   Give advice about your health   Answer your questions  Getting regular check-ups is an important part of taking care of your health. It can help your doctor find and treat any problems you have. But it's also important for preventing health problems.  A routine physical is different from a \"sick visit.\" A sick visit is when you see a doctor because of a health concern or problem. Since physicals are scheduled ahead of time, you can think about what you want to ask the doctor.  How often should I get a physical? -- It depends on your age and health. In general, for people age 21 years and older:   If you are younger than 50 years, you might be able to get a physical every 3 years.   If you are 50 years or older, your doctor might recommend a physical every year.  If you have an ongoing health condition, like diabetes or high blood pressure, your doctor will probably want to see you more often.  What happens during a physical? -- In general, each visit will include:   Physical exam - The doctor or nurse will check your height, weight, heart rate, and blood pressure. They will also look at your eyes and ears. They will ask about how you are feeling and whether you have any symptoms that bother you.   Medicines - It's a good idea to bring a list of all the medicines you take to each doctor visit. Your doctor will talk to you about your medicines and answer any questions. Tell them if you are having any side effects that bother you. You " "should also tell them if you are having trouble paying for any of your medicines.   Habits and behaviors - This includes:   Your diet   Your exercise habits   Whether you smoke, drink alcohol, or use drugs   Whether you are sexually active   Whether you feel safe at home  Your doctor will talk to you about things you can do to improve your health and lower your risk of health problems. They will also offer help and support. For example, if you want to quit smoking, they can give you advice and might prescribe medicines. If you want to improve your diet or get more physical activity, they can help you with this, too.   Lab tests, if needed - The tests you get will depend on your age and situation. For example, your doctor might want to check your:   Cholesterol   Blood sugar   Iron level   Vaccines - The recommended vaccines will depend on your age, health, and what vaccines you already had. Vaccines are very important because they can prevent certain serious or deadly infections.   Discussion of screening - \"Screening\" means checking for diseases or other health problems before they cause symptoms. Your doctor can recommend screening based on your age, risk, and preferences. This might include tests to check for:   Cancer, such as breast, prostate, cervical, ovarian, colorectal, prostate, lung, or skin cancer   Sexually transmitted infections, such as chlamydia and gonorrhea   Mental health conditions like depression and anxiety  Your doctor will talk to you about the different types of screening tests. They can help you decide which screenings to have. They can also explain what the results might mean.   Answering questions - The physical is a good time to ask the doctor or nurse questions about your health. If needed, they can refer you to other doctors or specialists, too.  Adults older than 65 years often need other care, too. As you get older, your doctor will talk to you about:   How to prevent falling at " home   Hearing or vision tests   Memory testing   How to take your medicines safely   Making sure that you have the help and support you need at home  All topics are updated as new evidence becomes available and our peer review process is complete.  This topic retrieved from [employer] on: May 02, 2024.  Topic 972772 Version 1.0  Release: 32.4.3 - C32.122  © 2024 UpToDate, Inc. and/or its affiliates. All rights reserved.  Consumer Information Use and Disclaimer   Disclaimer: This generalized information is a limited summary of diagnosis, treatment, and/or medication information. It is not meant to be comprehensive and should be used as a tool to help the user understand and/or assess potential diagnostic and treatment options. It does NOT include all information about conditions, treatments, medications, side effects, or risks that may apply to a specific patient. It is not intended to be medical advice or a substitute for the medical advice, diagnosis, or treatment of a health care provider based on the health care provider's examination and assessment of a patient's specific and unique circumstances. Patients must speak with a health care provider for complete information about their health, medical questions, and treatment options, including any risks or benefits regarding use of medications. This information does not endorse any treatments or medications as safe, effective, or approved for treating a specific patient. UpToDate, Inc. and its affiliates disclaim any warranty or liability relating to this information or the use thereof.The use of this information is governed by the Terms of Use, available at https://www.woltersPhotolitecuwer.com/en/know/clinical-effectiveness-terms. 2024© UpToDate, Inc. and its affiliates and/or licensors. All rights reserved.  Copyright   © 2024 UpToDate, Inc. and/or its affiliates. All rights reserved.
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
## 2025-07-22 ENCOUNTER — RESULTS FOLLOW-UP (OUTPATIENT)
Dept: FAMILY MEDICINE CLINIC | Facility: CLINIC | Age: 60
End: 2025-07-22